# Patient Record
Sex: FEMALE | Race: WHITE | Employment: OTHER | URBAN - METROPOLITAN AREA
[De-identification: names, ages, dates, MRNs, and addresses within clinical notes are randomized per-mention and may not be internally consistent; named-entity substitution may affect disease eponyms.]

---

## 2017-02-08 ENCOUNTER — OFFICE VISIT (OUTPATIENT)
Dept: NEUROLOGY | Age: 62
End: 2017-02-08

## 2017-02-08 VITALS
SYSTOLIC BLOOD PRESSURE: 124 MMHG | DIASTOLIC BLOOD PRESSURE: 70 MMHG | HEIGHT: 61 IN | RESPIRATION RATE: 20 BRPM | WEIGHT: 174 LBS | BODY MASS INDEX: 32.85 KG/M2

## 2017-02-08 DIAGNOSIS — G20 PARKINSON DISEASE (HCC): Primary | ICD-10-CM

## 2017-02-08 RX ORDER — ALBUTEROL SULFATE 90 UG/1
AEROSOL, METERED RESPIRATORY (INHALATION)
COMMUNITY
End: 2022-01-10

## 2017-02-08 NOTE — MR AVS SNAPSHOT
Visit Information     Date & Time Provider Department Dept. Phone Encounter #    2/8/2017 11:40 AM Guzman Tripp MD [facility] [phone] [id number]      Follow-up Instructions     Return in about 6 months (around 8/8/2017). Upcoming Health Maintenance        Date Due    Hepatitis C Screening 1955    DTaP/Tdap/Td series (1 - Tdap) 9/10/1974    PAP AKA CERVICAL CYTOLOGY 9/10/1974    FOBT Q 1 YEAR AGE 50-75 9/10/2003    ZOSTER VACCINE AGE 60> 9/10/2013    INFLUENZA AGE 9 TO ADULT 8/1/2016    BREAST CANCER SCRN MAMMOGRAM 12/14/2018      Allergies as of 2/8/2017  Review Complete On: 2/8/2017 By: Jagdish Richter LPN       Severity Noted Reaction Type Reactions    Ciprofloxacin  08/18/2015    Rash    Oxycontin [Oxycodone]  06/22/2011    Shortness of Breath, Itching    Sulfa (Sulfonamide Antibiotics)  06/22/2011    Rash    Zocor [Simvastatin]  08/18/2015    Myalgia      Current Immunizations  Reviewed on 7/20/2011    No immunizations on file. Not reviewed this visit   You Were Diagnosed With        Codes Comments    [facility]    -  Primary ICD-10-CM: G20  ICD-9-CM: 332.0       Vitals     BP Resp Height(growth percentile) Weight(growth percentile) BMI OB Status    124/70 20 5' 1\" (1.549 m) 174 lb (78.9 kg) 32.88 kg/m2 Postmenopausal    Smoking Status                   Never Smoker         Vitals History      BMI and BSA Data     Body Mass Index Body Surface Area    32.88 kg/m 2 1.84 m 2         Preferred Pharmacy       Pharmacy Name Phone    [facility] [id number] [facility] [phone]         Your Updated Medication List          This list is accurate as of: 2/8/17 12:13 PM.  Always use your most recent med list.                AFRIN SINUS (OXYMETAZOLINE) 0.05 % nasal spray   Generic drug:  oxymetazoline   2 Sprays by Both Nostrils route two (2) times a day.        albuterol 90 mcg/actuation inhaler   Commonly known as:  PROVENTIL HFA, VENTOLIN HFA, PROAIR HFA   Take  by inhalation. ALEVE PO   Take 440 mg by mouth two (2) times a day. aspirin 325 mg tablet   Commonly known as:  ASPIRIN   Take 325 mg by mouth daily. budesonide 180 mcg/actuation Aepb inhaler   Commonly known as:  PULMICORT   Take  by inhalation two (2) times a day. CALCIUM 600 + D 600-125 mg-unit Tab   Generic drug:  calcium-cholecalciferol (d3)   Take  by mouth. * carbidopa-levodopa 36. mg Cper   Commonly known as:  RYTARY   Take 1 Cap by mouth three (3) times daily. Take 1st dose 30-45 minutes before breakfast.  Replaces Carbidopa-levodopa  Indications: PARKINSONISM       * carbidopa-levodopa 36. mg Cper   Commonly known as:  RYTARY   Take 1 Cap by mouth three (3) times daily. Take 1st dose 30 to 45 minutes before breakfast.  Replaces Carbidopa-Levodopa       CRESTOR 5 mg tablet   Generic drug:  rosuvastatin   Take  by mouth nightly.       gabapentin 300 mg capsule   Commonly known as:  NEURONTIN   Take 300 mg by mouth three (3) times daily. hydroCHLOROthiazide 25 mg tablet   Commonly known as:  HYDRODIURIL   Take 25 mg by mouth daily. lansoprazole 30 mg disintegrating tablet   Commonly known as:  PREVACID SOLUTAB   Take  by mouth daily. LIPITOR 10 mg tablet   Generic drug:  atorvastatin   Take  by mouth daily. LORazepam 0.5 mg tablet   Commonly known as:  ATIVAN   Take  by mouth every four (4) hours as needed. MIRALAX 17 gram packet   Generic drug:  polyethylene glycol   Take 17 g by mouth daily. NEILMED SINUS RINSE COMPLETE NA   by Nasal route. OPANA ER 10 mg tablet   Generic drug:  oxyMORphone   Take 10 mg by mouth every twelve (12) hours. rasagiline 1 mg tablet   Commonly known as:  AZILECT   Take 1 Tab by mouth daily. RESTASIS 0.05 % ophthalmic emulsion   Generic drug:  cycloSPORINE   Administer 1 Drop to both eyes two (2) times a day.        rOPINIRole 6 mg Tb24   Take 6 mg by mouth daily for 90 days. Indications: IDIOPATHIC PARKINSONISM       VITAMIN D2 50,000 unit capsule   Generic drug:  ergocalciferol   Take 50,000 Units by mouth every seven (7) days. XOPENEX HFA 45 mcg/actuation inhaler   Generic drug:  levalbuterol tartrate   Take  by inhalation every six (6) hours as needed. * Notice: This list has 2 medication(s) that are the same as other medications prescribed for you. Read the directions carefully, and ask your doctor or other care provider to review them with you. Follow-up Instructions     Return in about 6 months (around 8/8/2017). Patient Instructions         Learning About Living [place]  What is a living will? A living will is a legal form you use to write down the kind of care you want at the end of your life. It is used by the health professionals who will treat you if you aren't able to decide for yourself. If you put your wishes in writing, your loved ones and others will know what kind of care you want. They won't need to guess. This can ease your mind and be helpful to others. A living will is not the same as an estate or property will. An estate will explains what you want to happen with your money and property after you die. Is a living will a legal document? A living will is a legal document. Each state has its own laws about living bruce. If you move to another state, make sure that your living will is legal in the state where you now live. Or you might use a universal form that has been approved by many states. This kind of form can sometimes be completed and stored online. Your electronic copy will then be available wherever you have a connection to the Internet. In most cases, doctors will respect your wishes even if you have a form from a different state. · You don't need an  to complete a living will.  But legal advice can be helpful if your state's laws are unclear, your health history is complicated, or your family can't agree on what should be in your living will. · You can change your living will at any time. Some people find that their wishes about end-of-life care change as their health changes. · In addition to making a living will, think about completing a medical power of  form. This form lets you name the person you want to make end-of-life treatment decisions for you (your \"health care agent\") if you're not able to. Many hospitals and nursing homes will give you the forms you need to complete a living will and a medical power of . · Your living will is used only if you can't make or communicate decisions for yourself anymore. If you become able to make decisions again, you can accept or refuse any treatment, no matter what you wrote in your living will. · Your state may offer an online registry. This is a place where you can store your living will online so the doctors and nurses who need to treat you can find it right away. What should you think about when creating a living will? Talk about your end-of-life wishes with your family members and your doctor. Let them know what you want. That way the people making decisions for you won't be surprised by your choices. Think about these questions as you make your living will:  · Do you know enough about life support methods that might be used? If not, talk to your doctor so you know what might be done if you can't breathe on your own, your heart stops, or you're unable to swallow. · What things would you still want to be able to do after you receive life-support methods? Would you want to be able to walk? To speak? To eat on your own? To live without the help of machines? · If you have a choice, where do you want to be cared for? In your home? At a hospital or nursing home? · Do you want certain Denominational practices performed if you become very ill? · If you have a choice at the end of your life, where would you prefer to die? At home?  In a hospital or nursing home? Somewhere else? · Would you prefer to be buried or cremated? · Do you want your organs to be donated after you die? What should you do with your living will? · Make sure that your family members and your health care agent have copies of your living will. · Give your doctor a copy of your living will to keep in your medical record. If you have more than one doctor, make sure that each one has a copy. · You may want to put a copy of your living will where it can be easily found. Where can you learn more? Go to http://deepaGloucester Pharmaceuticalsdimitris.info/. Enter J566 in the search box to learn more about \"Learning About Living Luisa Thomas. \"  Current as of: February 24, 2016  Content Version: 11.1  © 8042-9305 [employer]. Care instructions adapted under license by [employer] (which disclaims liability or warranty for this information). If you have questions about a medical condition or this instruction, always ask your healthcare professional. [place] [address] any warranty or liability for your use of this information. Advance Directives: Care Instructions  Your Care Instructions  An advance directive is a legal way to state your wishes at the end of your life. It tells your family and your doctor what to do if you can no longer say what you want. There are two main types of advance directives. You can change them any time that your wishes change. · A living will tells your family and your doctor your wishes about life support and other treatment. · A medical power of  lets you name a person to make treatment decisions for you when you can't speak for yourself. This person is called a health care agent. If you do not have an advance directive, decisions about your medical care may be made by a doctor or a  who doesn't know you. It may help to think of an advance directive as a gift to the people who care for you.  If you have one, they won't have to make tough decisions by themselves. Follow-up care is a key part of your treatment and safety. Be sure to make and go to all appointments, and call your doctor if you are having problems. It's also a good idea to know your test results and keep a list of the medicines you take. How can you care for yourself at home? · Discuss your wishes with your loved ones and your doctor. This way, there are no surprises. · Many states have a unique form. Or you might use a universal form that has been approved by many states. This kind of form can sometimes be completed and stored online. Your electronic copy will then be available wherever you have a connection to the Internet. In most cases, doctors will respect your wishes even if you have a form from a different state. · You don't need a  to do an advance directive. But you may want to get legal advice. · Think about these questions when you prepare an advance directive:  ¨ Who do you want to make decisions about your medical care if you are not able to? Many people choose a family member, close friend, or doctor. ¨ Do you know enough about life support methods that might be used? If not, talk to your doctor so you understand. ¨ What are you most afraid of that might happen? You might be afraid of having pain, losing your independence, or being kept alive by machines. ¨ Where would you prefer to die? Choices include your home, a hospital, or a nursing home. ¨ Would you like to have information about hospice care to support you and your family? ¨ Do you want to donate organs when you die? ¨ Do you want certain Worship practices performed before you die? If so, put your wishes in the advance directive. · Read your advance directive every year, and make changes as needed. When should you call for help? Be sure to contact your doctor if you have any questions. Where can you learn more?   Go to http://deepa-dimitris.info/. Enter R264 in the search box to learn more about \"Advance Directives: Care Instructions. \"  Current as of: February 24, 2016  Content Version: 11.1  © 6439-4730 [employer], [facility]. Care instructions adapted under license by [employer] (which disclaims liability or warranty for this information). If you have questions about a medical condition or this instruction, always ask your healthcare professional. [address] any warranty or liability for your use of this information. Introducing [facility]! Dear Linh Lozano:  Thank you for requesting a Nirvaha account. Our records indicate that you already have an active Nirvaha account. You can access your account anytime at [email or web address]     Did you know that you can access your hospital and ER discharge instructions at any time in Nirvaha? You can also review all of your test results from your hospital stay or ER visit. Additional Information    If you have questions, please visit the Frequently Asked Questions section of the Nirvaha website at [email or web address] Remember, Nirvaha is NOT to be used for urgent needs. For medical emergencies, dial 911. Now available from your iPhone and Android! Please provide this summary of care documentation to your next provider. Your primary care clinician is listed as [place]. If you have any questions after today's visit, please call [phone].

## 2017-02-08 NOTE — PATIENT INSTRUCTIONS
Learning About Living Anca  What is a living will? A living will is a legal form you use to write down the kind of care you want at the end of your life. It is used by the health professionals who will treat you if you aren't able to decide for yourself. If you put your wishes in writing, your loved ones and others will know what kind of care you want. They won't need to guess. This can ease your mind and be helpful to others. A living will is not the same as an estate or property will. An estate will explains what you want to happen with your money and property after you die. Is a living will a legal document? A living will is a legal document. Each state has its own laws about living bruce. If you move to another state, make sure that your living will is legal in the state where you now live. Or you might use a universal form that has been approved by many states. This kind of form can sometimes be completed and stored online. Your electronic copy will then be available wherever you have a connection to the Internet. In most cases, doctors will respect your wishes even if you have a form from a different state. · You don't need an  to complete a living will. But legal advice can be helpful if your state's laws are unclear, your health history is complicated, or your family can't agree on what should be in your living will. · You can change your living will at any time. Some people find that their wishes about end-of-life care change as their health changes. · In addition to making a living will, think about completing a medical power of  form. This form lets you name the person you want to make end-of-life treatment decisions for you (your \"health care agent\") if you're not able to. Many hospitals and nursing homes will give you the forms you need to complete a living will and a medical power of .   · Your living will is used only if you can't make or communicate decisions for yourself anymore. If you become able to make decisions again, you can accept or refuse any treatment, no matter what you wrote in your living will. · Your state may offer an online registry. This is a place where you can store your living will online so the doctors and nurses who need to treat you can find it right away. What should you think about when creating a living will? Talk about your end-of-life wishes with your family members and your doctor. Let them know what you want. That way the people making decisions for you won't be surprised by your choices. Think about these questions as you make your living will:  · Do you know enough about life support methods that might be used? If not, talk to your doctor so you know what might be done if you can't breathe on your own, your heart stops, or you're unable to swallow. · What things would you still want to be able to do after you receive life-support methods? Would you want to be able to walk? To speak? To eat on your own? To live without the help of machines? · If you have a choice, where do you want to be cared for? In your home? At a hospital or nursing home? · Do you want certain Nondenominational practices performed if you become very ill? · If you have a choice at the end of your life, where would you prefer to die? At home? In a hospital or nursing home? Somewhere else? · Would you prefer to be buried or cremated? · Do you want your organs to be donated after you die? What should you do with your living will? · Make sure that your family members and your health care agent have copies of your living will. · Give your doctor a copy of your living will to keep in your medical record. If you have more than one doctor, make sure that each one has a copy. · You may want to put a copy of your living will where it can be easily found. Where can you learn more? Go to http://deepa-dimitris.info/.   Enter R960 in the search box to learn more about \"Learning About Living Anca. \"  Current as of: February 24, 2016  Content Version: 11.1  © 6726-0096 [employer]. Care instructions adapted under license by [employer] (which disclaims liability or warranty for this information). If you have questions about a medical condition or this instruction, always ask your healthcare professional. [address] any warranty or liability for your use of this information. Advance Directives: Care Instructions  Your Care Instructions  An advance directive is a legal way to state your wishes at the end of your life. It tells your family and your doctor what to do if you can no longer say what you want. There are two main types of advance directives. You can change them any time that your wishes change. · A living will tells your family and your doctor your wishes about life support and other treatment. · A medical power of  lets you name a person to make treatment decisions for you when you can't speak for yourself. This person is called a health care agent. If you do not have an advance directive, decisions about your medical care may be made by a doctor or a  who doesn't know you. It may help to think of an advance directive as a gift to the people who care for you. If you have one, they won't have to make tough decisions by themselves. Follow-up care is a key part of your treatment and safety. Be sure to make and go to all appointments, and call your doctor if you are having problems. It's also a good idea to know your test results and keep a list of the medicines you take. How can you care for yourself at home? · Discuss your wishes with your loved ones and your doctor. This way, there are no surprises. · Many states have a unique form. Or you might use a universal form that has been approved by many states. This kind of form can sometimes be completed and stored online.  Your electronic copy will then be available wherever you have a connection to the Internet. In most cases, doctors will respect your wishes even if you have a form from a different state. · You don't need a  to do an advance directive. But you may want to get legal advice. · Think about these questions when you prepare an advance directive:  ¨ Who do you want to make decisions about your medical care if you are not able to? Many people choose a family member, close friend, or doctor. ¨ Do you know enough about life support methods that might be used? If not, talk to your doctor so you understand. ¨ What are you most afraid of that might happen? You might be afraid of having pain, losing your independence, or being kept alive by machines. ¨ Where would you prefer to die? Choices include your home, a hospital, or a nursing home. ¨ Would you like to have information about hospice care to support you and your family? ¨ Do you want to donate organs when you die? ¨ Do you want certain Mormonism practices performed before you die? If so, put your wishes in the advance directive. · Read your advance directive every year, and make changes as needed. When should you call for help? Be sure to contact your doctor if you have any questions. Where can you learn more? Go to http://deepa-dimitris.info/. Enter R264 in the search box to learn more about \"Advance Directives: Care Instructions. \"  Current as of: February 24, 2016  Content Version: 11.1  © 0109-8295 [employer]. Care instructions adapted under license by [employer] (which disclaims liability or warranty for this information). If you have questions about a medical condition or this instruction, always ask your healthcare professional. [address] any warranty or liability for your use of this information.

## 2017-02-08 NOTE — PROGRESS NOTES
Interval HPI:   This is a 59 y.o. female with Parkinson's who's following up for:     Chief Complaint   Patient presents with    Parkinsons Disease     Current meds:   Requip XL 6 mg/ AM, Sinemet one tab QID (6 AM, 9 AM, 2 PM, 6 PM), Azilect 1 mg/ AM  Higher doses of requip caused her significant dyskinesias. Lower doses of requip or sinemet worsen her parkinson's (feels stiffer, gait seems more shuffling)  Has not had tremors for a long time    Pt thinks things are about the same as last visit. Only thing new is that she's have episodes sometime in the late afternoon where she'll be sitting around and her legs look like they're shaking/ tremor. It can go on for up to an hour (not always an hour), not painful, not spasms. enies that she's having any paresthesias in legs that are causing her to move legs around. Can't say whether standing up and moving around will lessen the symptoms (?RLS) as she mainly just lets it take its course. Can't tell if it's related to her late morning/ early afternoon Sinemet dose wearing off early. Was complaining of hypophonia at last visit but says hasn't been bothering her/ others as much recently. Was having occasional nightmares, but those resolved for the most part after moving her Requip and Azilect to the morning. Gave her samples of Rytary at last visit but she couldn't tell much improvement (gave her a starting/ slightly lower dose) and she couldn't afford it at the time.          =============================================  Brief Hx:   Initially seen in June 2011 for Parkinson's/ mild at that time. Was on Requip XL 12 mg/ day and Azilect 1 mg TID. At a follow up visit Nov 2011 she described some worsening symptoms after a recent surgery, so Stalevo 50 mg BID added. Pt lost to follow up for 2 years at when she returned said she never tried the Stalevo. In Dec 2013 she reported increased fatigue, \"slowed down a lot,\" worsening handwriting, lower voice. Added Stalevo 12.5 mg TID. At follow up that was increased to 50 mg BID, Requip XL and Azilect continued at same doses. At follow up in Dec 2014, reported that when on Stalevo she has better mobility. Went to see [address] [place] [address] and she says the told her to stop the Stalevo and restart Sinemet  TID. She reported doing better overall at that point. Sometime between April and June 2015, insurance denied her Requip XL and she had to take Requip 2 mg TID. She called back saying she had abnormal body movements on the immediate release formulation of Requip and requested to go back to the XL version as didn't have SEFx with that that she could recall. Patient believes she restarted the Requip XL but EMR still has her listed as taking Requip 2 mg TID. She continued the Sinemet  TID and the Azilect 1 mg daily. Last visit note from January 2016 indicates that she wasn't having any tremors and her gait was normal on this regimen of medications so it was continued. At visit in May 2016, pt was c/o frequent body motions, can't sit still, rocks while sitting or when standing seems to move side to side. Appears restless in office and  / patient agree that what she's trying to describe does seem like body restlessness. No abnormal facial movements. Says after re-starting sinemet (Dec 2014) her daughter noted that he tongue kept sticking in/ out, but that hasn't happened in a while. Not on any antipsychotics or antidepressants. Was taking Lorazepam 0.5 mg every 4 hours prn. Pt was unclear what formulation/ dose Requip she was taking. Called back and let office know it was Requip XL 12 mg/ day. We gradually tapered it down to Requip XL 6 mg once a day and the akithisia resolved. Brief ROS: as above or otherwise negative  There have been no significant changes in PMHx, PSHx, SHx except as noted above.      Allergies   Allergen Reactions    Ciprofloxacin Rash    Oxycontin [Oxycodone] Shortness of Breath and Itching    Sulfa (Sulfonamide Antibiotics) Rash    Zocor [Simvastatin] Myalgia     Current Outpatient Prescriptions   Medication Sig Dispense Refill    albuterol (PROVENTIL HFA, VENTOLIN HFA, PROAIR HFA) 90 mcg/actuation inhaler Take  by inhalation.  atorvastatin (LIPITOR) 10 mg tablet Take  by mouth daily.  cycloSPORINE (RESTASIS) 0.05 % ophthalmic emulsion Administer 1 Drop to both eyes two (2) times a day.  OxyMORPhone (OPANA ER) 10 mg tablet Take 10 mg by mouth every twelve (12) hours.  gabapentin (NEURONTIN) 300 mg capsule Take 300 mg by mouth three (3) times daily.  calcium-cholecalciferol, d3, (CALCIUM 600 + D) 600-125 mg-unit Tab Take  by mouth.  SODIUM CHLORIDE/SODIUM BICARB (NEILMED SINUS RINSE COMPLETE NA) by Nasal route.  polyethylene glycol (MIRALAX) 17 gram packet Take 17 g by mouth daily.  aspirin (ASPIRIN) 325 mg tablet Take 325 mg by mouth daily.  NAPROXEN SODIUM (ALEVE PO) Take 440 mg by mouth two (2) times a day.  hydrochlorothiazide (HYDRODIURIL) 25 mg tablet Take 25 mg by mouth daily.  budesonide (PULMICORT) 180 mcg/Inhalation AePB inhaler Take  by inhalation two (2) times a day.  lorazepam (ATIVAN) 0.5 mg tablet Take  by mouth every four (4) hours as needed.  levalbuterol tartrate (XOPENEX HFA) 45 mcg/Actuation inhaler Take  by inhalation every six (6) hours as needed.  carbidopa-levodopa (RYTARY) 36. mg cpER Take 1 Cap by mouth three (3) times daily. Take 1st dose 30-45 minutes before breakfast.  Replaces Carbidopa-levodopa  Indications: PARKINSONISM 50 Cap 0    carbidopa-levodopa (RYTARY) 36. mg cpER Take 1 Cap by mouth three (3) times daily. Take 1st dose 30 to 45 minutes before breakfast.  Replaces Carbidopa-Levodopa 90 Cap 2    rOPINIRole 6 mg Tb24 Take 6 mg by mouth daily for 90 days.  Indications: IDIOPATHIC PARKINSONISM 90 Tab 1    rasagiline (AZILECT) 1 mg tablet Take 1 Tab by mouth daily. 90 Tab 1    oxymetazoline (AFRIN SINUS, OXYMETAZOLINE,) 0.05 % nasal spray 2 Sprays by Both Nostrils route two (2) times a day.  rosuvastatin (CRESTOR) 5 mg tablet Take  by mouth nightly.  ergocalciferol (VITAMIN D) 50,000 unit capsule Take 50,000 Units by mouth every seven (7) days.  lansoprazole (PREVACID SOLUTAB) 30 mg disintegrating tablet Take  by mouth daily. Physical Exam  Blood pressure 124/70, resp. rate 20, height 5' 1\" (1.549 m), weight 78.9 kg (174 lb). NAD, resting, no noticeable restlessness/ akithisia, no tremor   Neck: no stiffness  CV: regular heart rate  Lungs: clear  Skin: no rashes    Focused Neurological Exam     Mental status: Alert and oriented to person, place situation. Language: normal fluency and comprehension; no dysarthria  mildly low voice the longer she talks      CNs:   Visual fields grossly normal  Extraocular movements intact, no nystagmus  Face appears symmetric and facial strength normal.    Hearing is intact to casual conversation. Sensory: intact light touch in all 4 extremities  Motor: Normal bulk and strength in all 4 extremities. Cerebellar/ Extrapyramidal:   no resting tremor  no cog-wheeling     Reflexes: DTRs are symmetric, 2+     Gait: normal speed, no shuffling, reduced left arm swing, normal right arm swing, no en-bloc turning     Impression      ICD-10-CM ICD-9-CM    1. [facility] ([facility] G20 332.0         Overall Parkinson's stable from current combination of meds. Some of gait difficulty is due to chronic hip pains. Came up with plan that patient will pay more attention to the leg shaking (restlessness versus restless legs), see if it improves if she gets up and walks around (suggesting RLS), at what time of day it generally happens, after or before which Sinemet dose.   She'll then try taking one of her left over Requip 2 mg (immediate release) tablets prior to the typical onset of the leg shaking spells (happening a few days a week. ). She'll call back when she needs refills of her current meds and I'll send in 90 day Rxs.   See her back in 6 months

## 2017-07-11 ENCOUNTER — PATIENT MESSAGE (OUTPATIENT)
Dept: NEUROLOGY | Age: 62
End: 2017-07-11

## 2017-07-11 NOTE — TELEPHONE ENCOUNTER
From: Paulette Barajas  To: Lenore Ramirez MD  Sent: 7/11/2017 7:42 AM EDT  Subject: Prescription Question    Would you be willing to oversee the withdrawal of Opana and a replacement if necessary? FY.i- my previous experience with hydrocodone/oxycodone was not successful. I have also have some issues late day that I believe is medicine induced that I would like to review with you. I had planned on discussing them with you my next appointmen, August 9. The Opana market withdrawal needs immediate involvement. Thank you!

## 2017-07-11 NOTE — TELEPHONE ENCOUNTER
Called and spoke to the pt informing her that Dr. Yasir Olsen cannot monitor the withdrawal of OPANA.

## 2017-07-11 NOTE — TELEPHONE ENCOUNTER
----- Message from Karlene Church sent at 7/11/2017  7:42 AM EDT -----  Regarding: Prescription Question  Contact: [phone]  Would you be willing to oversee the withdrawal of Opana and a replacement if necessary? FY.i- my previous experience with hydrocodone/oxycodone was not successful. I have also have some issues late day that I believe is medicine induced that I would like to review with you. I had planned on discussing them with you my next appointmen, August 9. The Opana market withdrawal needs immediate involvement. Thank you!

## 2017-08-09 ENCOUNTER — OFFICE VISIT (OUTPATIENT)
Dept: NEUROLOGY | Age: 62
End: 2017-08-09

## 2017-08-09 VITALS
OXYGEN SATURATION: 96 % | BODY MASS INDEX: 33.23 KG/M2 | HEART RATE: 105 BPM | DIASTOLIC BLOOD PRESSURE: 70 MMHG | SYSTOLIC BLOOD PRESSURE: 104 MMHG | HEIGHT: 61 IN | WEIGHT: 176 LBS

## 2017-08-09 DIAGNOSIS — G20 PARKINSON DISEASE (HCC): Primary | ICD-10-CM

## 2017-08-09 RX ORDER — ROPINIROLE 6 MG/1
1 TABLET, FILM COATED, EXTENDED RELEASE ORAL DAILY
COMMUNITY
Start: 2017-07-31 | End: 2017-08-09 | Stop reason: SDUPTHER

## 2017-08-09 RX ORDER — ROPINIROLE 6 MG/1
1 TABLET, FILM COATED, EXTENDED RELEASE ORAL DAILY
Qty: 90 TAB | Refills: 1 | Status: SHIPPED | OUTPATIENT
Start: 2017-08-09 | End: 2017-11-07

## 2017-08-09 RX ORDER — CARBIDOPA AND LEVODOPA 25; 100 MG/1; MG/1
1 TABLET ORAL 4 TIMES DAILY
Qty: 360 TAB | Refills: 1 | Status: SHIPPED | OUTPATIENT
Start: 2017-08-09 | End: 2017-11-07

## 2017-08-09 RX ORDER — CARBIDOPA AND LEVODOPA 25; 100 MG/1; MG/1
1 TABLET ORAL 4 TIMES DAILY
COMMUNITY
End: 2017-08-09 | Stop reason: SDUPTHER

## 2017-08-09 RX ORDER — RASAGILINE 1 MG/1
1 TABLET ORAL DAILY
Qty: 90 TAB | Refills: 1 | Status: SHIPPED | OUTPATIENT
Start: 2017-08-09

## 2017-08-09 NOTE — PROGRESS NOTES
Interval HPI:   This is a 59 y.o. female with Parkinson's who's following up for:     Chief Complaint   Patient presents with    Parkinsons Disease     Current meds:   Requip XL 6 mg/ AM, Sinemet one tab QID (6 AM, 9 AM, 2 PM, 6 PM), Azilect 1 mg/ AM.   Higher doses of requip caused her significant dyskinesias. Lower doses of requip or sinemet worsen her parkinson's (feels stiffer, gait seems more shuffling). Has not had tremors for a long time. No change in Parkinson's symptoms that she can think of. Was feeling sleepy in the evening/ dinner time so stopped taking evening dose of Sinemet. She/  thinks her sleepiness is better now. Has multiple questions that are semi-related to Parkinson's. Says that she's been taking Opana for a long time for back/ hip pains, but because its being taken off the market, she's being tapered off that and may be going onto [place] versus tapering off of narcotics completely and doing PT and using gabapentin to address her pain.         =============================================  Brief Hx:   Initially seen in June 2011 for Parkinson's/ mild at that time. Was on Requip XL 12 mg/ day and Azilect 1 mg TID. At a follow up visit Nov 2011 she described some worsening symptoms after a recent surgery, so Stalevo 50 mg BID added. Pt lost to follow up for 2 years at when she returned said she never tried the Stalevo. In Dec 2013 she reported increased fatigue, \"slowed down a lot,\" worsening handwriting, lower voice. Added Stalevo 12.5 mg TID. At follow up that was increased to 50 mg BID, Requip XL and Azilect continued at same doses. At follow up in Dec 2014, reported that when on Stalevo she has better mobility. Went to see [address] [place] [address] and she says the told her to stop the Stalevo and restart Sinemet  TID. She reported doing better overall at that point.   Sometime between April and June 2015, insurance denied her Requip XL and she had to take Requip 2 mg TID. She called back saying she had abnormal body movements on the immediate release formulation of Requip and requested to go back to the XL version as didn't have SEFx with that that she could recall. Patient believes she restarted the Requip XL but EMR still has her listed as taking Requip 2 mg TID. She continued the Sinemet  TID and the Azilect 1 mg daily. Last visit note from January 2016 indicates that she wasn't having any tremors and her gait was normal on this regimen of medications so it was continued. At visit in May 2016, pt was c/o frequent body motions, can't sit still, rocks while sitting or when standing seems to move side to side. Appears restless in office and  / patient agree that what she's trying to describe does seem like body restlessness. No abnormal facial movements. Says after re-starting sinemet (Dec 2014) her daughter noted that he tongue kept sticking in/ out, but that hasn't happened in a while. Not on any antipsychotics or antidepressants. Was taking Lorazepam 0.5 mg every 4 hours prn. Pt was unclear what formulation/ dose Requip she was taking. Called back and let office know it was Requip XL 12 mg/ day. We gradually tapered it down to Requip XL 6 mg once a day and the akithisia resolved. Brief ROS: as above or otherwise negative  There have been no significant changes in PMHx, PSHx, SHx except as noted above. Allergies   Allergen Reactions    Ciprofloxacin Rash    Oxycontin [Oxycodone] Shortness of Breath and Itching    Sulfa (Sulfonamide Antibiotics) Rash    Zocor [Simvastatin] Myalgia     Current Outpatient Prescriptions   Medication Sig Dispense Refill    carbidopa-levodopa (SINEMET)  mg per tablet Take 1 Tab by mouth four (4) times daily for 90 days. 360 Tab 1    rOPINIRole 6 mg Tb24 Take 1 Tab by mouth daily for 90 days. 90 Tab 1    rasagiline (AZILECT) 1 mg tablet Take 1 Tab by mouth daily.  90 Tab 1    albuterol (PROVENTIL HFA, VENTOLIN HFA, PROAIR HFA) 90 mcg/actuation inhaler Take  by inhalation.  atorvastatin (LIPITOR) 10 mg tablet Take  by mouth daily.  oxymetazoline (AFRIN SINUS, OXYMETAZOLINE,) 0.05 % nasal spray 2 Sprays by Both Nostrils route two (2) times a day.  OxyMORPhone (OPANA ER) 10 mg tablet Take 10 mg by mouth every twelve (12) hours.  gabapentin (NEURONTIN) 300 mg capsule Take 300 mg by mouth three (3) times daily.  calcium-cholecalciferol, d3, (CALCIUM 600 + D) 600-125 mg-unit Tab Take  by mouth.  SODIUM CHLORIDE/SODIUM BICARB (NEILMED SINUS RINSE COMPLETE NA) by Nasal route.  polyethylene glycol (MIRALAX) 17 gram packet Take 17 g by mouth daily.  aspirin (ASPIRIN) 325 mg tablet Take 325 mg by mouth daily.  NAPROXEN SODIUM (ALEVE PO) Take 440 mg by mouth two (2) times a day.  ergocalciferol (VITAMIN D) 50,000 unit capsule Take 50,000 Units by mouth every seven (7) days.  hydrochlorothiazide (HYDRODIURIL) 25 mg tablet Take 25 mg by mouth daily.  budesonide (PULMICORT) 180 mcg/Inhalation AePB inhaler Take  by inhalation two (2) times a day.  lorazepam (ATIVAN) 0.5 mg tablet Take  by mouth every four (4) hours as needed.  levalbuterol tartrate (XOPENEX HFA) 45 mcg/Actuation inhaler Take  by inhalation every six (6) hours as needed.  cycloSPORINE (RESTASIS) 0.05 % ophthalmic emulsion Administer 1 Drop to both eyes two (2) times a day.  rosuvastatin (CRESTOR) 5 mg tablet Take  by mouth nightly.  lansoprazole (PREVACID SOLUTAB) 30 mg disintegrating tablet Take  by mouth daily. Physical Exam  Blood pressure 104/70, pulse (!) 105, height 5' 1\" (1.549 m), weight 79.8 kg (176 lb), SpO2 96 %. NAD, resting, no noticeable restlessness/ akithisia, no tremor   Neck: no stiffness  Skin: no rashes    Focused Neurological Exam     Mental status: Alert and oriented to person, place situation.     Language: normal fluency and comprehension; no dysarthria  mildly low voice the longer she talks      CNs:   Visual fields grossly normal  Extraocular movements intact, no nystagmus  Face appears symmetric and facial strength normal.    Hearing is intact to casual conversation. Sensory: intact light touch in all 4 extremities  Motor: Normal bulk and strength in all 4 extremities. Cerebellar/ Extrapyramidal:   no resting tremor  no cog-wheeling     Reflexes: DTRs are symmetric, 2+     Gait: normal speed, no shuffling, reduced left arm swing/ tilts shoulders to right, normal right arm swing, no en-bloc turning     Impression      ICD-10-CM ICD-9-CM    1. Parkinson disease ([facility] G20 332.0 rasagiline (AZILECT) 1 mg tablet        Overall Parkinson's stable from current combination of meds. Continue all meds as is.   Requests a 90 day RF of Azilect 1 mg/ day so that was sent to pharmacy  Doesn't need RF of other meds at this point    F/u in 6 months

## 2017-08-09 NOTE — MR AVS SNAPSHOT
Visit Information     Date & Time Provider Department Dept. Phone Encounter #    8/9/2017 11:40 AM Ina Horvath MD [address] [phone] [id number]      Follow-up Instructions     Return in about 6 months (around 2/9/2018). Upcoming Health Maintenance        Date Due    Hepatitis C Screening 1955    DTaP/Tdap/Td series (1 - Tdap) 9/10/1974    PAP AKA CERVICAL CYTOLOGY 9/10/1974    FOBT Q 1 YEAR AGE 50-75 9/10/2003    ZOSTER VACCINE AGE 60> 7/10/2013    INFLUENZA AGE 9 TO ADULT 8/1/2017    BREAST CANCER SCRN MAMMOGRAM 12/14/2018      Allergies as of 8/9/2017  Review Complete On: 8/9/2017 By: Blanca Jordan LPN       Severity Noted Reaction Type Reactions    Ciprofloxacin  08/18/2015    Rash    Oxycontin [Oxycodone]  06/22/2011    Shortness of Breath, Itching    Sulfa (Sulfonamide Antibiotics)  06/22/2011    Rash    Zocor [Simvastatin]  08/18/2015    Myalgia      Current Immunizations  Reviewed on 7/20/2011    No immunizations on file.        Not reviewed this visit   You Were Diagnosed With        Codes Comments    [facility]    -  Primary ICD-10-CM: G20  ICD-9-CM: 332.0       Vitals     BP Pulse Height(growth percentile) Weight(growth percentile) SpO2 BMI    104/70 (BP 1 Location: Left arm, BP Patient Position: Sitting) (!) 105 5' 1\" (1.549 m) 176 lb (79.8 kg) 96% 33.25 kg/m2    OB Status Smoking Status                Postmenopausal Never Smoker        Vitals History      BMI and BSA Data     Body Mass Index Body Surface Area    33.25 kg/m 2 1.85 m 2         Preferred Pharmacy       Pharmacy Name Phone    [facility], [place] [address] [phone]         Your Updated Medication List          This list is accurate as of: 8/9/17 12:29 PM.  Always use your most recent med list.                AFRIN SINUS (OXYMETAZOLINE) 0.05 % nasal spray   Generic drug:  oxymetazoline   2 Sprays by Both Nostrils route two (2) times a day.       albuterol 90 mcg/actuation inhaler   Commonly known as:  PROVENTIL HFA, VENTOLIN HFA, PROAIR HFA   Take  by inhalation. ALEVE PO   Take 440 mg by mouth two (2) times a day. aspirin 325 mg tablet   Commonly known as:  ASPIRIN   Take 325 mg by mouth daily. budesonide 180 mcg/actuation Aepb inhaler   Commonly known as:  PULMICORT   Take  by inhalation two (2) times a day. CALCIUM 600 + D 600-125 mg-unit Tab   Generic drug:  calcium-cholecalciferol (d3)   Take  by mouth.       carbidopa-levodopa  mg per tablet   Commonly known as:  SINEMET   Take 1 Tab by mouth four (4) times daily for 90 days. CRESTOR 5 mg tablet   Generic drug:  rosuvastatin   Take  by mouth nightly.       gabapentin 300 mg capsule   Commonly known as:  NEURONTIN   Take 300 mg by mouth three (3) times daily. hydroCHLOROthiazide 25 mg tablet   Commonly known as:  HYDRODIURIL   Take 25 mg by mouth daily. lansoprazole 30 mg disintegrating tablet   Commonly known as:  PREVACID SOLUTAB   Take  by mouth daily. LIPITOR 10 mg tablet   Generic drug:  atorvastatin   Take  by mouth daily. LORazepam 0.5 mg tablet   Commonly known as:  ATIVAN   Take  by mouth every four (4) hours as needed. MIRALAX 17 gram packet   Generic drug:  polyethylene glycol   Take 17 g by mouth daily. NEILMED SINUS RINSE COMPLETE NA   by Nasal route. OPANA ER 10 mg tablet   Generic drug:  oxyMORphone   Take 10 mg by mouth every twelve (12) hours. rasagiline 1 mg tablet   Commonly known as:  AZILECT   Take 1 Tab by mouth daily. RESTASIS 0.05 % ophthalmic emulsion   Generic drug:  cycloSPORINE   Administer 1 Drop to both eyes two (2) times a day. rOPINIRole 6 mg Tb24   Take 1 Tab by mouth daily for 90 days. VITAMIN D2 50,000 unit capsule   Generic drug:  ergocalciferol   Take 50,000 Units by mouth every seven (7) days.        XOPENEX HFA 45 mcg/actuation inhaler   Generic drug: levalbuterol tartrate   Take  by inhalation every six (6) hours as needed. Prescriptions Sent to Pharmacy        Refills    carbidopa-levodopa (SINEMET)  mg per tablet 1    Sig: Take 1 Tab by mouth four (4) times daily for 90 days. Class: Normal    [facility] [address] [place] [address] Ph #: [phone]    Route: Oral    rOPINIRole 6 mg Tb24 1    Sig: Take 1 Tab by mouth daily for 90 days. Class: Normal    Pharmacy: [facility] Ph #: [phone]    Route: Oral    rasagiline (AZILECT) 1 mg tablet 1    Sig: Take 1 Tab by mouth daily. Class: Normal    Pharmacy: [address] [place] [address], [address] [place] [address] Ph #: [phone]    Route: Oral      Follow-up Instructions     Return in about 6 months (around 2/9/2018). Introducing hospitals & [facility] SERVICES! Dear Giselle Granado:  Thank you for requesting a [employer] account. Our records indicate that you already have an active [employer] account. You can access your account anytime at [email or web address] Goodman     Did you know that you can access your hospital and ER discharge instructions at any time in [employer]? You can also review all of your test results from your hospital stay or ER visit. Additional Information    If you have questions, please visit the Frequently Asked Questions section of the [employer] website at [email or web address] Remember, [employer] is NOT to be used for urgent needs. For medical emergencies, dial 911. Now available from your iPhone and Android! Please provide this summary of care documentation to your next provider. Your primary care clinician is listed as [place]. If you have any questions after today's visit, please call [phone].

## 2017-09-06 ENCOUNTER — APPOINTMENT (OUTPATIENT)
Dept: CT IMAGING | Age: 62
End: 2017-09-06
Attending: EMERGENCY MEDICINE
Payer: COMMERCIAL

## 2017-09-06 ENCOUNTER — HOSPITAL ENCOUNTER (EMERGENCY)
Age: 62
Discharge: HOME | End: 2017-09-06
Attending: EMERGENCY MEDICINE | Admitting: EMERGENCY MEDICINE
Payer: COMMERCIAL

## 2017-09-06 ENCOUNTER — APPOINTMENT (OUTPATIENT)
Dept: GENERAL RADIOLOGY | Age: 62
End: 2017-09-06
Attending: EMERGENCY MEDICINE
Payer: COMMERCIAL

## 2017-09-06 ENCOUNTER — APPOINTMENT (OUTPATIENT)
Dept: ULTRASOUND IMAGING | Age: 62
End: 2017-09-06
Attending: EMERGENCY MEDICINE
Payer: COMMERCIAL

## 2017-09-06 VITALS
OXYGEN SATURATION: 93 % | TEMPERATURE: 97.3 F | HEART RATE: 112 BPM | SYSTOLIC BLOOD PRESSURE: 135 MMHG | RESPIRATION RATE: 20 BRPM | DIASTOLIC BLOOD PRESSURE: 66 MMHG

## 2017-09-06 DIAGNOSIS — M79.605 LEFT LEG PAIN: ICD-10-CM

## 2017-09-06 DIAGNOSIS — J45.901 ASTHMA WITH ACUTE EXACERBATION, UNSPECIFIED ASTHMA SEVERITY: Primary | ICD-10-CM

## 2017-09-06 DIAGNOSIS — J18.9 COMMUNITY ACQUIRED PNEUMONIA: ICD-10-CM

## 2017-09-06 DIAGNOSIS — R07.89 CHEST TIGHTNESS: ICD-10-CM

## 2017-09-06 LAB
ALBUMIN SERPL-MCNC: 3.9 G/DL (ref 3.5–5)
ALBUMIN/GLOB SERPL: 1.1 {RATIO} (ref 1.1–2.2)
ALP SERPL-CCNC: 95 U/L (ref 45–117)
ALT SERPL-CCNC: 10 U/L (ref 12–78)
ANION GAP SERPL CALC-SCNC: 9 MMOL/L (ref 5–15)
APTT PPP: 24.7 SEC (ref 22.1–32.5)
AST SERPL-CCNC: 20 U/L (ref 15–37)
ATRIAL RATE: 99 BPM
BASOPHILS # BLD: 0 K/UL (ref 0–0.1)
BASOPHILS NFR BLD: 0 % (ref 0–1)
BILIRUB SERPL-MCNC: 0.5 MG/DL (ref 0.2–1)
BUN SERPL-MCNC: 35 MG/DL (ref 6–20)
BUN/CREAT SERPL: 32 (ref 12–20)
CALCIUM SERPL-MCNC: 9.7 MG/DL (ref 8.5–10.1)
CALCULATED P AXIS, ECG09: 62 DEGREES
CALCULATED R AXIS, ECG10: -9 DEGREES
CALCULATED T AXIS, ECG11: 53 DEGREES
CHLORIDE SERPL-SCNC: 104 MMOL/L (ref 97–108)
CO2 SERPL-SCNC: 30 MMOL/L (ref 21–32)
CREAT SERPL-MCNC: 1.11 MG/DL (ref 0.55–1.02)
DIAGNOSIS, 93000: NORMAL
DIFFERENTIAL METHOD BLD: NORMAL
EOSINOPHIL # BLD: 0.2 K/UL (ref 0–0.4)
EOSINOPHIL NFR BLD: 3 % (ref 0–7)
ERYTHROCYTE [DISTWIDTH] IN BLOOD BY AUTOMATED COUNT: 12.8 % (ref 11.5–14.5)
GLOBULIN SER CALC-MCNC: 3.6 G/DL (ref 2–4)
GLUCOSE SERPL-MCNC: 89 MG/DL (ref 65–100)
HCT VFR BLD AUTO: 40.3 % (ref 35–47)
HGB BLD-MCNC: 13.4 G/DL (ref 11.5–16)
INR PPP: 1.1 (ref 0.9–1.1)
LYMPHOCYTES # BLD: 1.4 K/UL (ref 0.8–3.5)
LYMPHOCYTES NFR BLD: 23 % (ref 12–49)
MAGNESIUM SERPL-MCNC: 1.8 MG/DL (ref 1.6–2.4)
MCH RBC QN AUTO: 31.4 PG (ref 26–34)
MCHC RBC AUTO-ENTMCNC: 33.3 G/DL (ref 30–36.5)
MCV RBC AUTO: 94.4 FL (ref 80–99)
MONOCYTES # BLD: 0.4 K/UL (ref 0–1)
MONOCYTES NFR BLD: 7 % (ref 5–13)
NEUTS SEG # BLD: 4.2 K/UL (ref 1.8–8)
NEUTS SEG NFR BLD: 67 % (ref 32–75)
P-R INTERVAL, ECG05: 138 MS
PLATELET # BLD AUTO: 210 K/UL (ref 150–400)
POTASSIUM SERPL-SCNC: 3.5 MMOL/L (ref 3.5–5.1)
PROT SERPL-MCNC: 7.5 G/DL (ref 6.4–8.2)
PROTHROMBIN TIME: 10.5 SEC (ref 9–11.1)
Q-T INTERVAL, ECG07: 328 MS
QRS DURATION, ECG06: 88 MS
QTC CALCULATION (BEZET), ECG08: 420 MS
RBC # BLD AUTO: 4.27 M/UL (ref 3.8–5.2)
SODIUM SERPL-SCNC: 143 MMOL/L (ref 136–145)
THERAPEUTIC RANGE,PTTT: NORMAL SECS (ref 58–77)
TROPONIN I SERPL-MCNC: 0.04 NG/ML
VENTRICULAR RATE, ECG03: 99 BPM
WBC # BLD AUTO: 6.2 K/UL (ref 3.6–11)

## 2017-09-06 PROCEDURE — 85730 THROMBOPLASTIN TIME PARTIAL: CPT | Performed by: EMERGENCY MEDICINE

## 2017-09-06 PROCEDURE — 96374 THER/PROPH/DIAG INJ IV PUSH: CPT

## 2017-09-06 PROCEDURE — 85025 COMPLETE CBC W/AUTO DIFF WBC: CPT | Performed by: EMERGENCY MEDICINE

## 2017-09-06 PROCEDURE — 83735 ASSAY OF MAGNESIUM: CPT | Performed by: EMERGENCY MEDICINE

## 2017-09-06 PROCEDURE — 71275 CT ANGIOGRAPHY CHEST: CPT

## 2017-09-06 PROCEDURE — 94640 AIRWAY INHALATION TREATMENT: CPT

## 2017-09-06 PROCEDURE — 85610 PROTHROMBIN TIME: CPT | Performed by: EMERGENCY MEDICINE

## 2017-09-06 PROCEDURE — 74011000250 HC RX REV CODE- 250: Performed by: EMERGENCY MEDICINE

## 2017-09-06 PROCEDURE — 99285 EMERGENCY DEPT VISIT HI MDM: CPT

## 2017-09-06 PROCEDURE — 93971 EXTREMITY STUDY: CPT

## 2017-09-06 PROCEDURE — 74011636320 HC RX REV CODE- 636/320: Performed by: EMERGENCY MEDICINE

## 2017-09-06 PROCEDURE — 77030013140 HC MSK NEB VYRM -A

## 2017-09-06 PROCEDURE — 74011250636 HC RX REV CODE- 250/636: Performed by: EMERGENCY MEDICINE

## 2017-09-06 PROCEDURE — 80053 COMPREHEN METABOLIC PANEL: CPT | Performed by: EMERGENCY MEDICINE

## 2017-09-06 PROCEDURE — 93005 ELECTROCARDIOGRAM TRACING: CPT

## 2017-09-06 PROCEDURE — 36415 COLL VENOUS BLD VENIPUNCTURE: CPT | Performed by: EMERGENCY MEDICINE

## 2017-09-06 PROCEDURE — 71010 XR CHEST PORT: CPT

## 2017-09-06 PROCEDURE — 84484 ASSAY OF TROPONIN QUANT: CPT | Performed by: EMERGENCY MEDICINE

## 2017-09-06 PROCEDURE — 96361 HYDRATE IV INFUSION ADD-ON: CPT

## 2017-09-06 PROCEDURE — 74011250637 HC RX REV CODE- 250/637: Performed by: EMERGENCY MEDICINE

## 2017-09-06 RX ORDER — LEVALBUTEROL TARTRATE 45 UG/1
2 AEROSOL, METERED ORAL
Qty: 1 INHALER | Refills: 2 | Status: SHIPPED | OUTPATIENT
Start: 2017-09-06 | End: 2017-10-06

## 2017-09-06 RX ORDER — LEVALBUTEROL INHALATION SOLUTION 0.63 MG/3ML
0.63 SOLUTION RESPIRATORY (INHALATION)
Qty: 20 PACKAGE | Refills: 0 | Status: SHIPPED | OUTPATIENT
Start: 2017-09-06

## 2017-09-06 RX ORDER — NEBULIZER AND COMPRESSOR
1 EACH MISCELLANEOUS
Qty: 1 EACH | Refills: 0 | Status: SHIPPED | OUTPATIENT
Start: 2017-09-06

## 2017-09-06 RX ORDER — AZITHROMYCIN 250 MG/1
500 TABLET, FILM COATED ORAL
Status: COMPLETED | OUTPATIENT
Start: 2017-09-06 | End: 2017-09-06

## 2017-09-06 RX ORDER — AZITHROMYCIN 250 MG/1
250 TABLET, FILM COATED ORAL DAILY
Qty: 4 TAB | Refills: 0 | Status: SHIPPED | OUTPATIENT
Start: 2017-09-06 | End: 2017-09-10

## 2017-09-06 RX ADMIN — IOPAMIDOL 100 ML: 755 INJECTION, SOLUTION INTRAVENOUS at 07:25

## 2017-09-06 RX ADMIN — ALBUTEROL SULFATE 1 DOSE: 2.5 SOLUTION RESPIRATORY (INHALATION) at 06:24

## 2017-09-06 RX ADMIN — ALBUTEROL SULFATE 1 DOSE: 2.5 SOLUTION RESPIRATORY (INHALATION) at 07:40

## 2017-09-06 RX ADMIN — AZITHROMYCIN 500 MG: 250 TABLET, FILM COATED ORAL at 07:58

## 2017-09-06 RX ADMIN — SODIUM CHLORIDE 1000 ML: 900 INJECTION, SOLUTION INTRAVENOUS at 07:00

## 2017-09-06 NOTE — ED TRIAGE NOTES
Pt arrives to treatment room via wheelchair complaining of shortness of breath since last night. She used her xopenex inhaler with some relief.

## 2017-09-06 NOTE — ED NOTES
Pt c no wheezing at this point. sats 96%. cta shows ? Minimal inflammatory process, so will cover c atb.

## 2017-09-06 NOTE — ED PROVIDER NOTES
HPI Comments: The patient presents to the ED with shortness of breath and chest tightness. Symptoms began last night. She has hx asthma with no admissions. She notes using her Xopenex with only mild improvement of symptoms. She has no fever. She has 3/10 chest pain. Pain is in the middle of her chest and described as a tightness. She has non-productive cough. She denies any n/v/d or abdominal pain. She does have Factor V Leiden deficiency and hx DVT. She notes L calf/leg pain since yesterday. She had a 5 hr car trip last week. She is on chronic pain medications. She has no other concerns at this time. Her  drove her to the ED. Patient is a 59 y.o. female presenting with shortness of breath. The history is provided by the patient. Shortness of Breath   Associated symptoms include wheezing. Pertinent negatives include no fever, no headaches, no sore throat, no cough, no chest pain, no vomiting, no abdominal pain and no rash.         Past Medical History:   Diagnosis Date    Arthritis     Asthma     Cancer ([address] 2006    MELANOMA    Chronic pain     Constipation     Embolism - blood clot 2011    Gallbladder polyp     Gastro-oesophageal reflux disease with hiatal hernia     GERD (gastroesophageal reflux disease)     Hypertension     Liver disease     ELEVATED LIVER ENZYMES 2011    Melanoma [facility])     Neurological disorder     parkison's disease    Osteoporosis     Other ill-defined conditions     factor 5    Parkinson's disease     Seizures ([address]     FEBRILE SEIZURES AS A CHILD    Spinal stenosis of lumbar region     Steroid-induced avascular necrosis of hip ([address]        Past Surgical History:   Procedure Laterality Date    HX APPENDECTOMY  1961    HX GI      COLONSCOPY/EGD    HX ORTHOPAEDIC       left hip 7/2011    HX TONSILLECTOMY      [place] ANESTH,HIP JOINT SURGERY  07/18/2011    left hip surgery         Family History:   Problem Relation Age of Onset    Dementia Mother      AD Lewy Bodies    Hypertension Father     Asthma Father     Other Father      CONFUSION/ B/P DROPS    Cancer Paternal Aunt      STOMACH CA    Alcohol abuse Maternal Grandfather     Diabetes Paternal Grandfather     Heart Disease Paternal Grandfather      MI    Stroke Paternal Grandfather     Cancer Paternal Aunt      LEUKEMIA       Social History     Social History    Marital status:      Spouse name: N/A    Number of children: N/A    Years of education: N/A     Occupational History    Not on file. Social History Main Topics    Smoking status: Never Smoker    Smokeless tobacco: Never Used    Alcohol use Yes      Comment: 3-5 GLASSES WINE WEEKLY    Drug use: No    Sexual activity: Not on file     Other Topics Concern    Not on file     Social History Narrative         ALLERGIES: Ciprofloxacin; Oxycontin [oxycodone]; Sulfa (sulfonamide antibiotics); and Zocor [simvastatin]    Review of Systems   Constitutional: Negative for appetite change, chills and fever. HENT: Negative for congestion, nosebleeds and sore throat. Eyes: Negative for pain and discharge. Respiratory: Positive for chest tightness, shortness of breath and wheezing. Negative for cough. Cardiovascular: Negative for chest pain. Gastrointestinal: Negative for abdominal pain, diarrhea, nausea and vomiting. Genitourinary: Negative for dysuria. Musculoskeletal:        L leg pain   Skin: Negative for rash. Neurological: Negative for weakness and headaches. Hematological: Negative for adenopathy. Psychiatric/Behavioral: Negative. All other systems reviewed and are negative. There were no vitals filed for this visit. Physical Exam   Constitutional: She is oriented to person, place, and time. She appears well-developed and well-nourished. She appears distressed (appears to be SOB. ). HENT:   Head: Normocephalic and atraumatic.    Mouth/Throat: Oropharynx is clear and moist.   Eyes: Conjunctivae are normal.   Neck: Normal range of motion. Neck supple. Cardiovascular: Normal rate, regular rhythm and normal heart sounds. Pulmonary/Chest: She is in respiratory distress. She has wheezes (diffuse). RA sats 90-91%   Abdominal: Soft. Bowel sounds are normal. There is no tenderness. Musculoskeletal: Normal range of motion. She exhibits no edema or tenderness. L calf tenderness. No significant swelling noted. Neurological: She is alert and oriented to person, place, and time. Skin: Skin is warm and dry. Psychiatric: She has a normal mood and affect. Her behavior is normal.   Nursing note and vitals reviewed. [facility]  ED Course       Procedures    ED EKG interpretation:  Rhythm: normal sinus rhythm; and regular . Rate (approx.): 99; Axis: normal; P wave: normal; QRS interval: normal ; ST/T wave: non-specific changes; This EKG was interpreted by Jose Luis Helton MD,ED Provider. 6:54 AM  Decreased, but continued wheezing. Plan 2nd neb. She has declined prednisone. Discussed benefit of a short course of steroids. She does not want steroids at this time. A/P:  1. Asthma with exacerbation - duonebs, She declined solu-medrol and predisone because of AVN of her hip. 2. Chest tightness - recent travel, Factor V Leiden deficiency with hx DVT. Plan PE CT  3. L calf pain - hx DVT. Plan doppler    6:58 AM  Change of shift. Care of patient signed over to Dr. Keesha Mark at the bedside. Handoff complete.

## 2017-09-06 NOTE — ED NOTES
The patient was discharged home by Dr. Brennan and Arland Gilford, RN in stable condition, accompanied by spouse. The patient is alert and oriented, is in no respiratory distress. The patient's diagnosis, condition and treatment were explained to patient or parent/guardian. The patient/responsible party expressed understanding. 4 prescriptions given to pt. No work/school note given to pt. A discharge plan has been developed. A  was not involved in the process. Aftercare instructions were given to the patient.

## 2017-09-06 NOTE — DISCHARGE INSTRUCTIONS
Pneumonia: Care Instructions  Your Care Instructions    Pneumonia is an infection of the lungs. Most cases are caused by infections from bacteria or viruses. Pneumonia may be mild or very severe. If it is caused by bacteria, you will be treated with antibiotics. It may take a few weeks to a few months to recover fully from pneumonia, depending on how sick you were and whether your overall health is good. Follow-up care is a key part of your treatment and safety. Be sure to make and go to all appointments, and call your doctor if you are having problems. Its also a good idea to know your test results and keep a list of the medicines you take. How can you care for yourself at home? Take your antibiotics exactly as directed. Do not stop taking the medicine just because you are feeling better. You need to take the full course of antibiotics. Take your medicines exactly as prescribed. Call your doctor if you think you are having a problem with your medicine. Get plenty of rest and sleep. You may feel weak and tired for a while, but your energy level will improve with time. To prevent dehydration, drink plenty of fluids, enough so that your urine is light yellow or clear like water. Choose water and other caffeine-free clear liquids until you feel better. If you have kidney, heart, or liver disease and have to limit fluids, talk with your doctor before you increase the amount of fluids you drink. Take care of your cough so you can rest. A cough that brings up mucus from your lungs is common with pneumonia. It is one way your body gets rid of the infection. But if coughing keeps you from resting or causes severe fatigue and chest-wall pain, talk to your doctor. He or she may suggest that you take a medicine to reduce the cough. Use a vaporizer or humidifier to add moisture to your bedroom. Follow the directions for cleaning the machine. Do not smoke or allow others to smoke around you.  Smoke will make your cough last longer. If you need help quitting, talk to your doctor about stop-smoking programs and medicines. These can increase your chances of quitting for good. Take an over-the-counter pain medicine, such as acetaminophen (Tylenol), ibuprofen (Advil, Motrin), or naproxen (Aleve). Read and follow all instructions on the label. Do not take two or more pain medicines at the same time unless the doctor told you to. Many pain medicines have acetaminophen, which is Tylenol. Too much acetaminophen (Tylenol) can be harmful. If you were given a spirometer to measure how well your lungs are working, use it as instructed. This can help your doctor tell how your recovery is going. To prevent pneumonia in the future, talk to your doctor about getting a flu vaccine (once a year) and a pneumococcal vaccine (one time only for most people). When should you call for help? Call 911 anytime you think you may need emergency care. For example, call if:  You have severe trouble breathing. Call your doctor now or seek immediate medical care if:  You cough up dark brown or bloody mucus (sputum). You have new or worse trouble breathing. You are dizzy or lightheaded, or you feel like you may faint. Watch closely for changes in your health, and be sure to contact your doctor if:  You have a new or higher fever. You are coughing more deeply or more often. You are not getting better after 2 days (48 hours). You do not get better as expected. Where can you learn more? Go to http://deepa-dimitris.info/. Enter [id number] in the search box to learn more about \"Pneumonia: Care Instructions. \"  Current as of: March 25, 2017  Content Version: 11.3  © 6866-7363 [employer]. Care instructions adapted under license by [employer] (which disclaims liability or warranty for this information).  If you have questions about a medical condition or this instruction, always ask your healthcare professional. [employer], [facility] disclaims any warranty or liability for your use of this information. Asthma Attack: Care Instructions  Your Care Instructions    During an asthma attack, the airways swell and narrow. This makes it hard to breathe. Severe asthma attacks can be life-threatening, but you can help prevent them by keeping your asthma under control and treating symptoms before they get bad. Symptoms include being short of breath, having chest tightness, coughing, and wheezing. Noting and treating these symptoms can also help you avoid future trips to the emergency room. The doctor has checked you carefully, but problems can develop later. If you notice any problems or new symptoms, get medical treatment right away. Follow-up care is a key part of your treatment and safety. Be sure to make and go to all appointments, and call your doctor if you are having problems. It's also a good idea to know your test results and keep a list of the medicines you take. How can you care for yourself at home? · Follow your asthma action plan to prevent and treat attacks. If you don't have an asthma action plan, work with your doctor to create one. · Take your asthma medicines exactly as prescribed. Talk to your doctor right away if you have any questions about how to take them. ¨ Use your quick-relief medicine when you have symptoms of an attack. Quick-relief medicine is usually an albuterol inhaler. Some people need to use quick-relief medicine before they exercise. ¨ Take your controller medicine every day, not just when you have symptoms. Controller medicine is usually an inhaled corticosteroid. The goal is to prevent problems before they occur. Don't use your controller medicine to treat an attack that has already started. It doesn't work fast enough to help. ¨ If your doctor prescribed corticosteroid pills to use during an attack, take them exactly as prescribed.  It may take hours for the pills to work, but they may make the episode shorter and help you breathe better. ¨ Keep your quick-relief medicine with you at all times. · Talk to your doctor before using other medicines. Some medicines, such as aspirin, can cause asthma attacks in some people. · If you have a peak flow meter, use it to check how well you are breathing. This can help you predict when an asthma attack is going to occur. Then you can take medicine to prevent the asthma attack or make it less severe. · Do not smoke or allow others to smoke around you. Avoid smoky places. Smoking makes asthma worse. If you need help quitting, talk to your doctor about stop-smoking programs and medicines. These can increase your chances of quitting for good. · Learn what triggers an asthma attack for you, and avoid the triggers when you can. Common triggers include colds, smoke, air pollution, dust, pollen, mold, pets, cockroaches, stress, and cold air. · Avoid colds and the flu. Get a pneumococcal vaccine shot. If you have had one before, ask your doctor if you need a second dose. Get a flu vaccine every fall. If you must be around people with colds or the flu, wash your hands often. When should you call for help? Call 911 anytime you think you may need emergency care. For example, call if:  · You have severe trouble breathing. Call your doctor now or seek immediate medical care if:  · Your symptoms do not get better after you have followed your asthma action plan. · You have new or worse trouble breathing. · Your coughing and wheezing get worse. · You cough up dark brown or bloody mucus (sputum). · You have a new or higher fever. Watch closely for changes in your health, and be sure to contact your doctor if:  · You need to use quick-relief medicine on more than 2 days a week (unless it is just for exercise). · You cough more deeply or more often, especially if you notice more mucus or a change in the color of your mucus.   · You are not getting better as expected. Where can you learn more? Go to http://deepa-dimitris.info/. Enter T968 in the search box to learn more about \"Asthma Attack: Care Instructions. \"  Current as of: March 25, 2017  Content Version: 11.3  © 7108-8950 [employer]. Care instructions adapted under license by [employer] (which disclaims liability or warranty for this information). If you have questions about a medical condition or this instruction, always ask your healthcare professional. [place] [address] any warranty or liability for your use of this information. Chest Pain: Care Instructions  Your Care Instructions  There are many things that can cause chest pain. Some are not serious and will get better on their own in a few days. But some kinds of chest pain need more testing and treatment. Your doctor may have recommended a follow-up visit in the next 8 to 12 hours. If you are not getting better, you may need more tests or treatment. Even though your doctor has released you, you still need to watch for any problems. The doctor carefully checked you, but sometimes problems can develop later. If you have new symptoms or if your symptoms do not get better, get medical care right away. If you have worse or different chest pain or pressure that lasts more than 5 minutes or you passed out (lost consciousness), call 911 or seek other emergency help right away. A medical visit is only one step in your treatment. Even if you feel better, you still need to do what your doctor recommends, such as going to all suggested follow-up appointments and taking medicines exactly as directed. This will help you recover and help prevent future problems. How can you care for yourself at home? · Rest until you feel better. · Take your medicine exactly as prescribed. Call your doctor if you think you are having a problem with your medicine.   · Do not drive after taking a prescription pain medicine. When should you call for help? Call 911 if:  · You passed out (lost consciousness). · You have severe difficulty breathing. · You have symptoms of a heart attack. These may include:  ¨ Chest pain or pressure, or a strange feeling in your chest.  ¨ Sweating. ¨ Shortness of breath. ¨ Nausea or vomiting. ¨ Pain, pressure, or a strange feeling in your back, neck, jaw, or upper belly or in one or both shoulders or arms. ¨ Lightheadedness or sudden weakness. ¨ A fast or irregular heartbeat. After you call 911, the  may tell you to chew 1 adult-strength or 2 to 4 low-dose aspirin. Wait for an ambulance. Do not try to drive yourself. Call your doctor today if:  · You have any trouble breathing. · Your chest pain gets worse. · You are dizzy or lightheaded, or you feel like you may faint. · You are not getting better as expected. · You are having new or different chest pain. Where can you learn more? Go to http://deepa-dimitris.info/. Enter A120 in the search box to learn more about \"Chest Pain: Care Instructions. \"  Current as of: March 20, 2017  Content Version: 11.3  © 4338-9702 [employer]. Care instructions adapted under license by [employer] (which disclaims liability or warranty for this information). If you have questions about a medical condition or this instruction, always ask your healthcare professional. [place] [address] any warranty or liability for your use of this information. Leg Pain: Care Instructions  Your Care Instructions  Many things can cause leg pain. Too much exercise or overuse can cause a muscle cramp (or charley horse). You can get leg cramps from not eating a balanced diet that has enough potassium, calcium, and other minerals. If you do not drink enough fluids or are taking certain medicines, you may develop leg cramps.  Other causes of leg pain include injuries, blood flow problems, nerve damage, and twisted and enlarged veins (varicose veins). You can usually ease pain with self-care. Your doctor may recommend that you rest your leg and keep it elevated. Follow-up care is a key part of your treatment and safety. Be sure to make and go to all appointments, and call your doctor if you are having problems. Its also a good idea to know your test results and keep a list of the medicines you take. How can you care for yourself at home? · Take pain medicines exactly as directed. ¨ If the doctor gave you a prescription medicine for pain, take it as prescribed. ¨ If you are not taking a prescription pain medicine, ask your doctor if you can take an over-the-counter medicine. · Take any other medicines exactly as prescribed. Call your doctor if you think you are having a problem with your medicine. · Rest your leg while you have pain, and avoid standing for long periods of time. · Prop up your leg at or above the level of your heart when possible. · Make sure you are eating a balanced diet that is rich in calcium, potassium, and magnesium, especially if you are pregnant. · If directed by your doctor, put ice or a cold pack on the area for 10 to 20 minutes at a time. Put a thin cloth between the ice and your skin. · Your leg may be in a splint, a brace, or an elastic bandage, and you may have crutches to help you walk. Follow your doctor's directions about how long to wear supports and how to use the crutches. When should you call for help? Call 911 anytime you think you may need emergency care. For example, call if:  · You have sudden chest pain and shortness of breath, or you cough up blood. · Your leg is cool or pale or changes color. Call your doctor now or seek immediate medical care if:  · You have increasing or severe pain. · Your leg suddenly feels weak and you cannot move it. · You have signs of a blood clot, such as:  ¨ Pain in your calf, back of the knee, thigh, or groin.   ¨ Redness and swelling in your leg or groin. · You have signs of infection, such as:  ¨ Increased pain, swelling, warmth, or redness. ¨ Red streaks leading from the sore area. ¨ Pus draining from a place on your leg. ¨ A fever. · You cannot bear weight on your leg. Watch closely for changes in your health, and be sure to contact your doctor if:  · You do not get better as expected. Where can you learn more? Go to http://deepa-dimitris.info/. Enter S992 in the search box to learn more about \"Leg Pain: Care Instructions. \"  Current as of: March 20, 2017  Content Version: 11.3  © 7485-8640 [employer]. Care instructions adapted under license by [employer]ac[employer] by [employer] (which disclaims liability or warranty for this information). If you have questions about a medical condition or this instruction, always ask your healthcare professional. [address] any warranty or liability for your use of this information.

## 2017-09-20 ENCOUNTER — TELEPHONE (OUTPATIENT)
Dept: NEUROLOGY | Age: 62
End: 2017-09-20

## 2017-09-20 DIAGNOSIS — G25.81 RESTLESS LEG SYNDROME, UNCONTROLLED: Primary | ICD-10-CM

## 2017-09-20 NOTE — TELEPHONE ENCOUNTER
----- Message from 56Derek Randall sent at 9/20/2017 11:23 AM EDT -----  Regarding: Dr. Garves Mask: [phone]  Michael Forrest is requesting to have the rx previously discussed for REM sleep, she believes the name Clazipam pt uses [address] [place] [address] on file. She has advised she has stopped taking Opana, and has started a Butran patch.  PT is leaving [facility] 9.21.17

## 2017-09-20 NOTE — TELEPHONE ENCOUNTER
Let pt know that it's not a good idea for her to be on Clonazepam (or any other benzodiazepine, sedating medication) when she's on strong pain medications (Butrans). If she needs something for her RLS, then we would need to try the extended release version of Gabapentin (horizant). I'll send that Rx, it may need a prior auth.

## 2018-02-05 ENCOUNTER — OFFICE VISIT (OUTPATIENT)
Dept: NEUROLOGY | Age: 63
End: 2018-02-05

## 2018-02-05 ENCOUNTER — DOCUMENTATION ONLY (OUTPATIENT)
Dept: NEUROLOGY | Age: 63
End: 2018-02-05

## 2018-02-05 VITALS
BODY MASS INDEX: 33.99 KG/M2 | WEIGHT: 180 LBS | SYSTOLIC BLOOD PRESSURE: 124 MMHG | HEART RATE: 96 BPM | OXYGEN SATURATION: 96 % | DIASTOLIC BLOOD PRESSURE: 80 MMHG | HEIGHT: 61 IN

## 2018-02-05 DIAGNOSIS — G25.81 RESTLESS LEG SYNDROME, UNCONTROLLED: ICD-10-CM

## 2018-02-05 DIAGNOSIS — G20 PARKINSON DISEASE (HCC): Primary | ICD-10-CM

## 2018-02-05 RX ORDER — ROPINIROLE 6 MG/1
TABLET, FILM COATED, EXTENDED RELEASE ORAL EVERY EVENING
COMMUNITY

## 2018-02-05 RX ORDER — GABAPENTIN 100 MG/1
200 CAPSULE ORAL 4 TIMES DAILY
COMMUNITY
End: 2018-02-05

## 2018-02-05 RX ORDER — GUAIFENESIN AND PHENYLEPHRINE HCL 400; 10 MG/1; MG/1
TABLET ORAL
COMMUNITY

## 2018-02-05 RX ORDER — CARBIDOPA AND LEVODOPA 25; 100 MG/1; MG/1
1 TABLET ORAL 4 TIMES DAILY
COMMUNITY
Start: 2017-12-11

## 2018-02-05 NOTE — MR AVS SNAPSHOT
[facility] [address] [facility] [id number] [phone]     Patient: Nicki Lennox  MRN: [id number]  XOK:[id number]               Visit Information     Date & Time Provider Department Dept. Phone Encounter #    2/5/2018 11:40 AM Sheldon Hogan MD [address] [phone] [id number]      Upcoming Health Maintenance        Date Due    Hepatitis C Screening 1955    Pneumococcal 19-64 Medium Risk (1 of 1 - PPSV23) 9/10/1972    DTaP/Tdap/Td series (1 - Tdap) 9/10/1974    PAP AKA CERVICAL CYTOLOGY 9/10/1974    FOBT Q 1 YEAR AGE 50-75 9/10/2003    ZOSTER VACCINE AGE 60> 7/10/2013    Influenza Age 9 to Adult 8/1/2017    BREAST CANCER SCRN MAMMOGRAM 12/14/2018      Allergies as of 2/5/2018  Review Complete On: 2/5/2018 By: Leyda Lee LPN       Severity Noted Reaction Type Reactions    Ciprofloxacin  08/18/2015    Rash    Oxycontin [Oxycodone]  06/22/2011    Shortness of Breath, Itching    Sulfa (Sulfonamide Antibiotics)  06/22/2011    Rash    Zocor [Simvastatin]  08/18/2015    Myalgia      Current Immunizations  Reviewed on 7/20/2011    No immunizations on file.        Not reviewed this visit   You Were Diagnosed With        Codes Comments    Parkinson disease ([facility] [address]    -  Primary ICD-10-CM: G20  ICD-9-CM: 332.0     Restless leg syndrome, uncontrolled     ICD-10-CM: G25.81  ICD-9-CM: 333.94       Vitals     BP Pulse Height(growth percentile) Weight(growth percentile) SpO2 BMI    124/80 (BP 1 Location: Left arm, BP Patient Position: Sitting) 96 5' 1\" (1.549 m) 180 lb (81.6 kg) 96% 34.01 kg/m2    OB Status Smoking Status                Postmenopausal Never Smoker          BMI and BSA Data     Body Mass Index Body Surface Area    34.01 kg/m 2 1.87 m 2         Preferred Pharmacy       Pharmacy Name Phone    [facility] [place] - [address] [phone]         Your Updated Medication List          This list is accurate as of: 2/5/18 12:08 PM.  Always use your most recent med list.                AFRIN SINUS (OXYMETAZOLINE) 0.05 % nasal spray   Generic drug:  oxymetazoline   2 Sprays by Both Nostrils route two (2) times a day. albuterol 90 mcg/actuation inhaler   Commonly known as:  PROVENTIL HFA, VENTOLIN HFA, PROAIR HFA   Take  by inhalation. ALEVE PO   Take 440 mg by mouth two (2) times a day. aspirin 325 mg tablet   Commonly known as:  ASPIRIN   Take 325 mg by mouth daily. budesonide 180 mcg/actuation Aepb inhaler   Commonly known as:  PULMICORT   Take  by inhalation two (2) times a day. CALCIUM 600 + D 600-125 mg-unit Tab   Generic drug:  calcium-cholecalciferol (d3)   Take  by mouth.       carbidopa-levodopa  mg per tablet   Commonly known as:  SINEMET   Take 1 Tab by mouth four (4) times daily. CRESTOR 5 mg tablet   Generic drug:  rosuvastatin   Take  by mouth nightly.       gabapentin enacarbil 600 mg Tber   Commonly known as:  HORIZANT   Take 600 mg by mouth daily (with dinner) for 30 days. Indications: Restless Legs Syndrome       hydroCHLOROthiazide 25 mg tablet   Commonly known as:  HYDRODIURIL   Take 25 mg by mouth daily. lansoprazole 30 mg disintegrating tablet   Commonly known as:  PREVACID SOLUTAB   Take  by mouth daily. levalbuterol 0.63 mg/3 mL Nebu   Commonly known as:  XOPENEX   3 mL by Nebulization route every six (6) hours as needed. LIPITOR 10 mg tablet   Generic drug:  atorvastatin   Take  by mouth daily. LORazepam 0.5 mg tablet   Commonly known as:  ATIVAN   Take  by mouth every four (4) hours as needed. MIRALAX 17 gram packet   Generic drug:  polyethylene glycol   Take 17 g by mouth daily. Nebulizer & Compressor machine   1 Each by Does Not Apply route four (4) times daily as needed. NEILMED SINUS RINSE COMPLETE NA   by Nasal route.        OPANA ER 10 mg tablet   Generic drug:  oxyMORphone   Take 10 mg by mouth every twelve (12) hours. rasagiline 1 mg tablet   Commonly known as:  AZILECT   Take 1 Tab by mouth daily. REQUIP XL 6 mg Tb24   Generic drug:  rOPINIRole   Take  by mouth. RESTASIS 0.05 % ophthalmic emulsion   Generic drug:  cycloSPORINE   Administer 1 Drop to both eyes two (2) times a day. turmeric root extract 500 mg Cap   Take  by mouth. VITAMIN D2 50,000 unit capsule   Generic drug:  ergocalciferol   Take 50,000 Units by mouth every seven (7) days. Prescriptions Printed        Refills    gabapentin enacarbil (HORIZANT) 600 mg TbER 2    Sig: Take 600 mg by mouth daily (with dinner) for 30 days. Indications: Restless Legs Syndrome    Class: Print    Route: Oral      We Performed the Following     REFERRAL TO NEUROLOGY [WJK17 Custom]     Comments:    57 y.o. female with Parkinson's. Referring for 2nd opinion, optimal med regimen. See attached notes. To-Do List     02/13/2018 2:15 PM   (Arrive by 2:00 PM)     Appointment with [facility] at [facility] ([phone])   Shower or bathe using soap and water. Do not use deodorant, powder, perfumes, or lotion the day of your exam.  If your prior mammograms were not performed at [facility] 6 please bring films with you or forward prior images 2 days before your procedure. Check in at registration 15min before your appointment time unless you were instructed to do otherwise. A script is not necessary, but if you have one, please bring it on the day of the mammogram or have it faxed to the department.   [facility] [phone] [facility]  [phone] [facility] [phone] [facility]  [phone] [facility] [phone] [facility] 0547 [facility] [phone]   Please arrive 15 minutes prior to appointment to register         Referral Information     Referral ID Referred By Referred To       [id number] Marika 5115 MD Noah Swift       [address] [address]       [facility], [address] Phone: [phone]       Fax: [phone]         Visits Status Start Date End Date    1 New Request 2/5/18 2/5/19    If your referral has a status of pending review or denied, additional information will be sent to support the outcome of this decision. Introducing [facility] & HEALTH SERVICES! Dear Casie Armenta:  Thank you for requesting a [employer] account. Our records indicate that you already have an active [employer] account. You can access your account anytime at [email or web address]     Did you know that you can access your hospital and ER discharge instructions at any time in [employer]? You can also review all of your test results from your hospital stay or ER visit. Additional Information    If you have questions, please visit the Frequently Asked Questions section of the [employer] website at [email or web address] Remember, [employer] is NOT to be used for urgent needs. For medical emergencies, dial 911. Now available from your iPhone and Android! Please provide this summary of care documentation to your next provider. Your primary care clinician is listed as [place]. If you have any questions after today's visit, please call [phone].

## 2018-02-05 NOTE — PROGRESS NOTES
Interval HPI:   This is a 57 y.o. female with Parkinson's who's following up for:     Chief Complaint   Patient presents with    Parkinsons Disease     Interval Hx:    Current meds:   Requip XL 6 mg/ AM, Sinemet one tab QID (6 AM, 9 AM, 2 PM, 6 PM), Azilect 1 mg/ AM.   Higher doses of requip caused her significant dyskinesias. Lower doses of requip or sinemet worsen her parkinson's (feels stiffer, gait seems more shuffling). Has not had tremors for a long time. Called back after last visit complaining of sleep disturbance (has hx of REM sleep disorder) and requested to restart Clonazepam.  Declined that Rx because she was on strong opioid medications (was on Opana which was switched to Leann), with the concern being for oversedation. Offered to put her on Horizant (gabapentin encarbil) for both the REM and RLS symptoms. Pt reports that she had been taking 100 mg capsules, 2 caps QID for chronic bilateral legs pains (hip, bursitis). Her RLS symptoms haven't been controlled on the immediate release Gabapentin and she now wants to try the ER gabapentin to see if it addresses this problem as well as her chronic leg pains. Parkinson's related complaints: worsening handwriting, worsening balance/ stumbles more frequently (couple days a week). If taking her meds, no shuffling, if misses her meds, starts shuffling. Sleep disturbance; says sleep is better than when she called back in Sept 2017. She's not kicking, screaming, or acting out any dreams, but she has recorded audio of herself during sleep and says she might groan a couple times a night but doesn't affect her sleep. In the past 4-6 months, she's been feeling like there's something in her right peripheral vision, like an animal but when she looks in that direction it's gone.    No hx of macular degeneration.        =============================================  Brief Hx:   Initially seen by Dr Fiona Olsen in June 2011 for Parkinson's/ mild at that time. Was on Requip XL 12 mg/ day and Azilect 1 mg TID. At a follow up visit Nov 2011 she described some worsening symptoms after a recent surgery, so Stalevo 50 mg BID added. Pt lost to follow up for 2 years at when she returned said she never tried the Stalevo. In Dec 2013 she reported increased fatigue, \"slowed down a lot,\" worsening handwriting, lower voice. Added Stalevo 12.5 mg TID. At follow up that was increased to 50 mg BID, Requip XL and Azilect continued at same doses. At follow up in Dec 2014, reported that when on Stalevo she has better mobility. Went to see [facility]/ Dr Lynne Lau and she says the told her to stop the Stalevo and restart Sinemet  TID. She reported doing better overall at that point. Sometime between April and June 2015, insurance denied her Requip XL and she had to take Requip 2 mg TID. She called back saying she had abnormal body movements on the immediate release formulation of Requip and requested to go back to the XL version as didn't have SEFx with that that she could recall. Patient believes she restarted the Requip XL but EMR still has her listed as taking Requip 2 mg TID. She continued the Sinemet  TID and the Azilect 1 mg daily. Last visit note from January 2016 indicates that she wasn't having any tremors and her gait was normal on this regimen of medications so it was continued. At visit in May 2016, pt was c/o frequent body motions, can't sit still, rocks while sitting or when standing seems to move side to side. Appeared restless in office and  / patient agree that what she's trying to describe does seem like body restlessness. No abnormal facial movements. Says after re-starting sinemet (Dec 2014) her daughter noted that her tongue kept sticking in/ out, but that hasn't happened in a while. Not on any antipsychotics or antidepressants. Was taking Lorazepam 0.5 mg every 4 hours prn.   Pt was unclear what formulation/ dose Requip she was taking. Called back and let office know it was Requip XL 12 mg/ day. We gradually tapered it down to Requip XL 6 mg once a day and the akithisia resolved. Brief ROS: as above or otherwise negative  There have been no significant changes in PMHx, PSHx, SHx except as noted above. Allergies   Allergen Reactions    Ciprofloxacin Rash    Oxycontin [Oxycodone] Shortness of Breath and Itching    Sulfa (Sulfonamide Antibiotics) Rash    Zocor [Simvastatin] Myalgia     Current Outpatient Prescriptions   Medication Sig Dispense Refill    carbidopa-levodopa (SINEMET)  mg per tablet Take 1 Tab by mouth four (4) times daily.  turmeric root extract 500 mg cap Take  by mouth.  rOPINIRole (REQUIP XL) 6 mg Tb24 Take  by mouth.  gabapentin enacarbil (HORIZANT) 600 mg TbER Take 600 mg by mouth daily (with dinner) for 30 days. Indications: Restless Legs Syndrome 30 Tab 2    rasagiline (AZILECT) 1 mg tablet Take 1 Tab by mouth daily. 90 Tab 1    albuterol (PROVENTIL HFA, VENTOLIN HFA, PROAIR HFA) 90 mcg/actuation inhaler Take  by inhalation.  atorvastatin (LIPITOR) 10 mg tablet Take  by mouth daily.  oxymetazoline (AFRIN SINUS, OXYMETAZOLINE,) 0.05 % nasal spray 2 Sprays by Both Nostrils route two (2) times a day.  cycloSPORINE (RESTASIS) 0.05 % ophthalmic emulsion Administer 1 Drop to both eyes two (2) times a day.  OxyMORPhone (OPANA ER) 10 mg tablet Take 10 mg by mouth every twelve (12) hours.  calcium-cholecalciferol, d3, (CALCIUM 600 + D) 600-125 mg-unit Tab Take  by mouth.  SODIUM CHLORIDE/SODIUM BICARB (NEILMED SINUS RINSE COMPLETE NA) by Nasal route.  polyethylene glycol (MIRALAX) 17 gram packet Take 17 g by mouth daily.  aspirin (ASPIRIN) 325 mg tablet Take 325 mg by mouth daily.  NAPROXEN SODIUM (ALEVE PO) Take 440 mg by mouth two (2) times a day.       ergocalciferol (VITAMIN D) 50,000 unit capsule Take 50,000 Units by mouth every seven (7) days.  lansoprazole (PREVACID SOLUTAB) 30 mg disintegrating tablet Take  by mouth daily.  hydrochlorothiazide (HYDRODIURIL) 25 mg tablet Take 25 mg by mouth daily.  budesonide (PULMICORT) 180 mcg/Inhalation AePB inhaler Take  by inhalation two (2) times a day.  lorazepam (ATIVAN) 0.5 mg tablet Take  by mouth every four (4) hours as needed.  Nebulizer & Compressor machine 1 Each by Does Not Apply route four (4) times daily as needed. 1 Each 0    levalbuterol (XOPENEX) 0.63 mg/3 mL nebu 3 mL by Nebulization route every six (6) hours as needed. 20 Package 0    rosuvastatin (CRESTOR) 5 mg tablet Take  by mouth nightly. Physical Exam  Blood pressure 124/80, pulse 96, height 5' 1\" (1.549 m), weight 81.6 kg (180 lb), SpO2 96 %. NAD, resting, no noticeable restlessness/ akithisia, no tremors  Neck: no stiffness  Skin: no rashes    Focused Neurological Exam     Mental status: Alert and oriented to person, place situation. Language: normal fluency and comprehension; no dysarthria; no hypophonic voice      CNs:   Visual fields grossly normal  Extraocular movements intact, no nystagmus  Face appears symmetric and facial strength normal.    Hearing is intact to casual conversation. Sensory: intact light touch in all 4 extremities  Motor: Normal bulk and strength in all 4 extremities. Cerebellar/ Extrapyramidal:   no resting or postural tremors  no cog-wheeling     Reflexes: 2+ patellars    Gait: normal speed, no shuffling, reduced left arm swing/ tilts shoulders to right, normal right arm swing, no en-bloc turning     Impression      ICD-10-CM ICD-9-CM    1.  Parkinson disease ([facility] [address] G20 332.0 REFERRAL TO NEUROLOGY   2. Restless leg syndrome, uncontrolled G25.81 333.94 gabapentin enacarbil (HORIZANT) 600 mg TbER      REFERRAL TO NEUROLOGY        Pt feels majority of Parkinson's symptoms are under control but asks if she should see Parkinson's specialist for review of her meds and any suggestions on med changes to optimize her management. She had seen [facility] in 2014-5 but didn't like the interaction she had at that time. She's open to returning there if she can see Dr Jasson Pearson but was also open to seeing a Parkinson's specialist in the community. We agreed that I'd refer her to Dr [facility] for review of her med regimen and discussion of parkinson's related symptoms.      For now, continue Azilect 1 mg/ day, Requip XL 6 mg qAM, and Sinemet 1 tab QID  Will try Horizant 600 mg with dinner, instead of the Gabapentin 200 mg QID for her RLS symptoms  Will need Prior Auth on the Horizant     Signed By: Cheryln Meigs, MD     February 5, 2018

## 2018-02-13 ENCOUNTER — HOSPITAL ENCOUNTER (OUTPATIENT)
Dept: MAMMOGRAPHY | Age: 63
Discharge: HOME | End: 2018-02-13
Attending: INTERNAL MEDICINE
Payer: MEDICARE

## 2018-02-13 DIAGNOSIS — Z12.39 BREAST SCREENING: ICD-10-CM

## 2018-02-13 PROCEDURE — 77067 SCR MAMMO BI INCL CAD: CPT

## 2018-08-23 RX ORDER — ROPINIROLE 6 MG/1
TABLET, FILM COATED, EXTENDED RELEASE ORAL
Qty: 30 TAB | Refills: 0 | OUTPATIENT
Start: 2018-08-23

## 2018-09-19 RX ORDER — CARBIDOPA AND LEVODOPA 25; 100 MG/1; MG/1
TABLET ORAL
Qty: 360 TAB | Refills: 0 | OUTPATIENT
Start: 2018-09-19

## 2018-09-19 NOTE — TELEPHONE ENCOUNTER
Call patient and see if she's now being followed by Dr Rhea Marcus parkinsons specialist.  If so, he will need to refill her parkinson's meds.   Thanks

## 2018-10-18 ENCOUNTER — HOSPITAL ENCOUNTER (OUTPATIENT)
Dept: GENERAL RADIOLOGY | Age: 63
Discharge: HOME | End: 2018-10-18
Attending: INTERNAL MEDICINE
Payer: MEDICARE

## 2018-10-18 DIAGNOSIS — R52 PAIN: ICD-10-CM

## 2018-10-18 PROCEDURE — 73630 X-RAY EXAM OF FOOT: CPT

## 2018-10-18 PROCEDURE — 73610 X-RAY EXAM OF ANKLE: CPT

## 2019-03-19 ENCOUNTER — HOSPITAL ENCOUNTER (OUTPATIENT)
Dept: MAMMOGRAPHY | Age: 64
Discharge: HOME | End: 2019-03-19
Attending: INTERNAL MEDICINE
Payer: MEDICARE

## 2019-03-19 ENCOUNTER — APPOINTMENT (OUTPATIENT)
Dept: MAMMOGRAPHY | Age: 64
End: 2019-03-19
Attending: INTERNAL MEDICINE
Payer: MEDICARE

## 2019-03-19 DIAGNOSIS — Z12.39 SCREENING BREAST EXAMINATION: ICD-10-CM

## 2019-03-19 PROCEDURE — 77067 SCR MAMMO BI INCL CAD: CPT

## 2019-04-08 ENCOUNTER — HOSPITAL ENCOUNTER (OUTPATIENT)
Dept: MAMMOGRAPHY | Age: 64
Discharge: HOME | End: 2019-04-08
Attending: INTERNAL MEDICINE
Payer: MEDICARE

## 2019-04-08 DIAGNOSIS — M85.89 OTHER SPECIFIED DISORDERS OF BONE DENSITY AND STRUCTURE, MULTIPLE SITES: ICD-10-CM

## 2019-04-08 PROCEDURE — 77080 DXA BONE DENSITY AXIAL: CPT

## 2019-05-10 ENCOUNTER — HOSPITAL ENCOUNTER (OUTPATIENT)
Dept: MRI IMAGING | Age: 64
Discharge: HOME | End: 2019-05-10
Attending: PSYCHIATRY & NEUROLOGY
Payer: MEDICARE

## 2019-05-10 DIAGNOSIS — R53.1 LEFT-SIDED WEAKNESS: ICD-10-CM

## 2019-05-10 PROCEDURE — 70551 MRI BRAIN STEM W/O DYE: CPT

## 2020-11-06 ENCOUNTER — HOSPITAL ENCOUNTER (EMERGENCY)
Age: 65
Discharge: HOME | End: 2020-11-06
Attending: EMERGENCY MEDICINE
Payer: MEDICARE

## 2020-11-06 ENCOUNTER — APPOINTMENT (OUTPATIENT)
Dept: ULTRASOUND IMAGING | Age: 65
End: 2020-11-06
Attending: EMERGENCY MEDICINE
Payer: MEDICARE

## 2020-11-06 VITALS
OXYGEN SATURATION: 95 % | WEIGHT: 174.16 LBS | HEART RATE: 97 BPM | HEIGHT: 61 IN | TEMPERATURE: 96.7 F | RESPIRATION RATE: 18 BRPM | SYSTOLIC BLOOD PRESSURE: 134 MMHG | DIASTOLIC BLOOD PRESSURE: 90 MMHG | BODY MASS INDEX: 32.88 KG/M2

## 2020-11-06 DIAGNOSIS — M79.10 MYALGIA: ICD-10-CM

## 2020-11-06 DIAGNOSIS — R06.02 SHORTNESS OF BREATH: Primary | ICD-10-CM

## 2020-11-06 LAB
ALBUMIN SERPL-MCNC: 3.8 G/DL (ref 3.5–5)
ALBUMIN/GLOB SERPL: 1.2 {RATIO} (ref 1.1–2.2)
ALP SERPL-CCNC: 106 U/L (ref 45–117)
ALT SERPL-CCNC: 9 U/L (ref 12–78)
ANION GAP SERPL CALC-SCNC: 6 MMOL/L (ref 5–15)
AST SERPL-CCNC: 17 U/L (ref 15–37)
BASOPHILS # BLD: 0 K/UL (ref 0–0.1)
BASOPHILS NFR BLD: 0 % (ref 0–1)
BILIRUB SERPL-MCNC: 0.6 MG/DL (ref 0.2–1)
BUN SERPL-MCNC: 20 MG/DL (ref 6–20)
BUN/CREAT SERPL: 21 (ref 12–20)
CALCIUM SERPL-MCNC: 9 MG/DL (ref 8.5–10.1)
CHLORIDE SERPL-SCNC: 102 MMOL/L (ref 97–108)
CO2 SERPL-SCNC: 33 MMOL/L (ref 21–32)
CREAT SERPL-MCNC: 0.94 MG/DL (ref 0.55–1.02)
D DIMER PPP FEU-MCNC: 0.54 MG/L FEU (ref 0–0.65)
DIFFERENTIAL METHOD BLD: NORMAL
EOSINOPHIL # BLD: 0.2 K/UL (ref 0–0.4)
EOSINOPHIL NFR BLD: 3 % (ref 0–7)
ERYTHROCYTE [DISTWIDTH] IN BLOOD BY AUTOMATED COUNT: 12.8 % (ref 11.5–14.5)
GLOBULIN SER CALC-MCNC: 3.2 G/DL (ref 2–4)
GLUCOSE SERPL-MCNC: 93 MG/DL (ref 65–100)
HCT VFR BLD AUTO: 40.4 % (ref 35–47)
HGB BLD-MCNC: 12.8 G/DL (ref 11.5–16)
IMM GRANULOCYTES # BLD AUTO: 0 K/UL (ref 0–0.04)
IMM GRANULOCYTES NFR BLD AUTO: 0 % (ref 0–0.5)
LYMPHOCYTES # BLD: 1 K/UL (ref 0.8–3.5)
LYMPHOCYTES NFR BLD: 14 % (ref 12–49)
MCH RBC QN AUTO: 29.7 PG (ref 26–34)
MCHC RBC AUTO-ENTMCNC: 31.7 G/DL (ref 30–36.5)
MCV RBC AUTO: 93.7 FL (ref 80–99)
MONOCYTES # BLD: 0.6 K/UL (ref 0–1)
MONOCYTES NFR BLD: 8 % (ref 5–13)
NEUTS SEG # BLD: 5.4 K/UL (ref 1.8–8)
NEUTS SEG NFR BLD: 74 % (ref 32–75)
NRBC # BLD: 0 K/UL (ref 0–0.01)
NRBC BLD-RTO: 0 PER 100 WBC
PLATELET # BLD AUTO: 208 K/UL (ref 150–400)
PMV BLD AUTO: 11.4 FL (ref 8.9–12.9)
POTASSIUM SERPL-SCNC: 3.4 MMOL/L (ref 3.5–5.1)
PROT SERPL-MCNC: 7 G/DL (ref 6.4–8.2)
RBC # BLD AUTO: 4.31 M/UL (ref 3.8–5.2)
SODIUM SERPL-SCNC: 141 MMOL/L (ref 136–145)
TROPONIN I SERPL-MCNC: <0.05 NG/ML
WBC # BLD AUTO: 7.2 K/UL (ref 3.6–11)

## 2020-11-06 PROCEDURE — 80053 COMPREHEN METABOLIC PANEL: CPT

## 2020-11-06 PROCEDURE — 84484 ASSAY OF TROPONIN QUANT: CPT

## 2020-11-06 PROCEDURE — 99284 EMERGENCY DEPT VISIT MOD MDM: CPT

## 2020-11-06 PROCEDURE — 93005 ELECTROCARDIOGRAM TRACING: CPT

## 2020-11-06 PROCEDURE — 93970 EXTREMITY STUDY: CPT

## 2020-11-06 PROCEDURE — 36415 COLL VENOUS BLD VENIPUNCTURE: CPT

## 2020-11-06 PROCEDURE — 85379 FIBRIN DEGRADATION QUANT: CPT

## 2020-11-06 PROCEDURE — 85025 COMPLETE CBC W/AUTO DIFF WBC: CPT

## 2020-11-06 NOTE — ED TRIAGE NOTES
Pt ambulates to treatment area she states that since last night she has had some SOB that is different than her asthma SOB. She used her nebulizer last night but it did not seem to help for very long. She also has some bilateral calf cramping that seems to be worse in the right calf. She also for 2 weeks has had mid back pain along her bra line that is also intermittent.   Dr Derek Tamayo at the bedside

## 2020-11-06 NOTE — ED PROVIDER NOTES
The history is provided by the patient. Leg Pain    This is a new problem. The current episode started more than 2 days ago. The problem occurs constantly. The problem has not changed since onset. The pain is present in the left lower leg and right lower leg. The quality of the pain is described as constant. The pain is moderate. Pertinent negatives include no numbness, full range of motion, no stiffness, no tingling, no itching, no back pain and no neck pain. She has tried nothing for the symptoms. The treatment provided no relief. There has been no history of extremity trauma. Shortness of Breath   This is a chronic problem. The problem occurs continuously. The current episode started yesterday. The problem has not changed since onset. Associated symptoms include wheezing and leg pain. Pertinent negatives include no fever, no headaches, no coryza, no rhinorrhea, no sore throat, no swollen glands, no ear pain, no neck pain, no cough, no sputum production, no PND, no orthopnea, no chest pain, no syncope, no vomiting, no abdominal pain, no rash, no leg swelling and no claudication. She has tried ipratropium inhalers and inhaled steroids for the symptoms. The treatment provided no relief. She has had no prior hospitalizations. She has had prior ED visits. Associated medical issues include asthma and DVT.         Past Medical History:   Diagnosis Date    Arthritis     Asthma     Cancer ([facility] [address] 2006    MELANOMA    Chronic pain     Constipation     Embolism - blood clot 2011    Gallbladder polyp     Gastro-oesophageal reflux disease with hiatal hernia     GERD (gastroesophageal reflux disease)     Hypertension     Liver disease     ELEVATED LIVER ENZYMES 2011    Melanoma [facility])     Neurological disorder     parkison's disease    Osteoporosis     Other ill-defined conditions(199.89)     factor 5    Parkinson's disease     Seizures ([facility] [address]     FEBRILE SEIZURES AS A CHILD    Spinal stenosis of lumbar region  Steroid-induced avascular necrosis of hip (HCC)        Past Surgical History:   Procedure Laterality Date    HX APPENDECTOMY  1961    HX GI      COLONSCOPY/EGD    HX ORTHOPAEDIC       left hip 7/2011    HX TONSILLECTOMY      [place] ANESTH,HIP JOINT SURGERY  07/18/2011    left hip surgery         Family History:   Problem Relation Age of Onset    Dementia Mother         AD Lewy Bodies    Hypertension Father     Asthma Father     Other Father         CONFUSION/ B/P DROPS    Cancer Paternal Aunt         STOMACH CA    Alcohol abuse Maternal Grandfather     Diabetes Paternal Grandfather     Heart Disease Paternal Grandfather         MI    Stroke Paternal Grandfather     Cancer Paternal Aunt         LEUKEMIA       Social History     Socioeconomic History    Marital status:      Spouse name: Not on file    Number of children: Not on file    Years of education: Not on file    Highest education level: Not on file   Occupational History    Not on file   Social Needs    Financial resource strain: Not on file    Food insecurity     Worry: Not on file     Inability: Not on file    Transportation needs     Medical: Not on file     Non-medical: Not on file   Tobacco Use    Smoking status: Never Smoker    Smokeless tobacco: Never Used   Substance and Sexual Activity    Alcohol use: Yes     Comment: 3-5 GLASSES WINE WEEKLY    Drug use: No    Sexual activity: Not on file   Lifestyle    Physical activity     Days per week: Not on file     Minutes per session: Not on file    Stress: Not on file   Relationships    Social connections     Talks on phone: Not on file     Gets together: Not on file     Attends [employer] service: Not on file     Active member of club or organization: Not on file     Attends meetings of clubs or organizations: Not on file     Relationship status: Not on file    Intimate partner violence     Fear of current or ex partner: Not on file     Emotionally abused: Not on file Physically abused: Not on file     Forced sexual activity: Not on file   Other Topics Concern    Not on file   Social History Narrative    Not on file         ALLERGIES: Ciprofloxacin; Oxycontin [oxycodone]; Sulfa (sulfonamide antibiotics); and Zocor [simvastatin]    Review of Systems   Constitutional: Negative for activity change, chills and fever. HENT: Negative for ear pain, nosebleeds, rhinorrhea, sore throat, trouble swallowing and voice change. Eyes: Negative for visual disturbance. Respiratory: Positive for shortness of breath and wheezing. Negative for cough and sputum production. Cardiovascular: Negative for chest pain, palpitations, orthopnea, claudication, leg swelling, syncope and PND. Gastrointestinal: Negative for abdominal pain, constipation, diarrhea, nausea and vomiting. Genitourinary: Negative for difficulty urinating, dysuria, hematuria and urgency. Musculoskeletal: Positive for myalgias. Negative for back pain, neck pain, neck stiffness and stiffness. Skin: Negative for color change, itching and rash. Allergic/Immunologic: Negative for immunocompromised state. Neurological: Negative for dizziness, tingling, seizures, syncope, weakness, light-headedness, numbness and headaches. Psychiatric/Behavioral: Negative for behavioral problems, confusion, hallucinations, self-injury and suicidal ideas. Vitals:    11/06/20 1456   BP: (!) 157/84   Pulse: 97   Resp: 18   Temp: (!) 96.7 °F (35.9 °C)   SpO2: 94%   Weight: 79 kg (174 lb 2.6 oz)   Height: 5' 1\" (1.549 m)            Physical Exam  Vitals signs and nursing note reviewed. Constitutional:       General: She is not in acute distress. Appearance: She is well-developed. She is not diaphoretic. HENT:      Head: Normocephalic and atraumatic. Eyes:      Pupils: Pupils are equal, round, and reactive to light. Neck:      Musculoskeletal: Normal range of motion and neck supple.    Cardiovascular:      Rate and Rhythm: Normal rate and regular rhythm. Heart sounds: Normal heart sounds. No murmur. No friction rub. No gallop. Pulmonary:      Effort: Pulmonary effort is normal. No respiratory distress. Breath sounds: Wheezing present. Abdominal:      General: Bowel sounds are normal. There is no distension. Palpations: Abdomen is soft. Tenderness: There is no abdominal tenderness. There is no guarding or rebound. Musculoskeletal: Normal range of motion. Skin:     General: Skin is warm. Findings: No rash. Neurological:      Mental Status: She is alert and oriented to person, place, and time. Psychiatric:         Behavior: Behavior normal.         Thought Content: Thought content normal.         Judgment: Judgment normal.          MDM     This is a 38-year-old female with past medical history, review of systems, physical exam as above, presenting with complaints of calf pain and shortness of breath. Patient states a history of asthma, states she uses her albuterol inhaler twice daily, uses nebulizer twice monthly, states shortness of breath became worse yesterday, states it feels \"different\" than her typical asthma exacerbation. She denies fevers or chills, chest pain, recent travel or known sick contacts. She states bilateral lower extremity cramping, and a history of factor V Leiden disease. She states previous history of DVT, following surgery, not currently taking blood thinners. Physical exam is remarkable for well-appearing elderly female, in no acute distress, noted to be mildly hypertensive, afebrile, satting 97% on room air, without tachypnea or tachycardia. She has nontender nonerythematous bilateral calves, diminished breath sounds with scattered wheezing, soft nontender abdomen. Suspect acute on chronic asthma exacerbation, however will evaluate via CMP, CBC, D-dimer, cardiac enzymes, chest x-ray and EKG.   We will provide nebulizer treatments, reassess, and make a disposition. Procedures    4:43 PM  Patient refusing nebulizer treatments despite mild wheezing on exam,  Negative Doppler, EKG, cardiac enzymes and D-dimer. Likely chronic asthma with mild exacerbation. Remains satting well on room air no acute distress. Will discharge home to continue her medications, discussed increased compliance, primary care follow-up, return precautions given.

## 2020-11-06 NOTE — DISCHARGE INSTRUCTIONS
Patient Education        Shortness of Breath: Care Instructions  Your Care Instructions     Shortness of breath has many causes. Sometimes conditions such as anxiety can lead to shortness of breath. Some people get mild shortness of breath when they exercise. Trouble breathing also can be a symptom of a serious problem, such as asthma, lung disease, emphysema, heart problems, and pneumonia. If your shortness of breath continues, you may need tests and treatment. Watch for any changes in your breathing and other symptoms. Follow-up care is a key part of your treatment and safety. Be sure to make and go to all appointments, and call your doctor if you are having problems. It's also a good idea to know your test results and keep a list of the medicines you take. How can you care for yourself at home? · Do not smoke or allow others to smoke around you. If you need help quitting, talk to your doctor about stop-smoking programs and medicines. These can increase your chances of quitting for good. · Get plenty of rest and sleep. · Take your medicines exactly as prescribed. Call your doctor if you think you are having a problem with your medicine. · Find healthy ways to deal with stress. ? Exercise daily. ? Get plenty of sleep. ? Eat regularly and well. When should you call for help? Call 911 anytime you think you may need emergency care. For example, call if:    · You have severe shortness of breath.     · You have symptoms of a heart attack. These may include:  ? Chest pain or pressure, or a strange feeling in the chest.  ? Sweating. ? Shortness of breath. ? Nausea or vomiting. ? Pain, pressure, or a strange feeling in the back, neck, jaw, or upper belly or in one or both shoulders or arms. ? Lightheadedness or sudden weakness. ? A fast or irregular heartbeat. After you call 911, the  may tell you to chew 1 adult-strength or 2 to 4 low-dose aspirin. Wait for an ambulance.  Do not try to drive yourself. Call your doctor now or seek immediate medical care if:    · Your shortness of breath gets worse or you start to wheeze. Wheezing is a high-pitched sound when you breathe.     · You wake up at night out of breath or have to prop your head up on several pillows to breathe.     · You are short of breath after only light activity or while at rest.   Watch closely for changes in your health, and be sure to contact your doctor if:    · You do not get better over the next 1 to 2 days. Where can you learn more? Go to http://www.gray.com/  Enter S780 in the search box to learn more about \"Shortness of Breath: Care Instructions. \"  Current as of: February 24, 2020               Content Version: 12.6  © 2006-2020 [email or web address] Care instructions adapted under license by [employer] (which disclaims liability or warranty for this information). If you have questions about a medical condition or this instruction, always ask your healthcare professional. [place] [address] any warranty or liability for your use of this information. Patient Education        Asthma in Adults: Care Instructions  Your Care Instructions     During an asthma attack, your airways swell and narrow as a reaction to certain things (triggers). This makes it hard to breathe. You may be able to prevent asthma attacks if you avoid the things that set off your asthma symptoms. Keeping your asthma under control and treating symptoms before they get bad can help you avoid severe attacks. If you can control your asthma, you may be able to do all of your normal daily activities. You may also avoid asthma attacks and trips to the hospital.  Follow-up care is a key part of your treatment and safety. Be sure to make and go to all appointments, and call your doctor if you are having problems.  It's also a good idea to know your test results and keep a list of the medicines you take.  How can you care for yourself at home? · Follow your asthma action plan so you can manage your symptoms at home. An asthma action plan will help you prevent and control airway reactions and will tell you what to do during an asthma attack. If you do not have an asthma action plan, work with your doctor to build one. · Take your asthma medicine exactly as prescribed. Medicine plays an important role in controlling asthma. Talk to your doctor right away if you have any questions about what to take and how to take it. ? Use your quick-relief medicine when you have symptoms of an attack. Quick-relief medicine often is an albuterol inhaler. Some people need to use quick-relief medicine before they exercise. ? Take your controller medicine every day, not just when you have symptoms. Controller medicine is usually an inhaled corticosteroid. The goal is to prevent problems before they occur. Do not use your controller medicine to try to treat an attack that has already started. It does not work fast enough to help. ? If your doctor prescribed corticosteroid pills to use during an attack, take them as directed. They may take hours to work, but they may shorten the attack and help you breathe better. ? Keep your quick-relief medicine with you at all times. · Talk to your doctor before using other medicines. Some medicines, such as aspirin, can cause asthma attacks in some people. · Check yourself for asthma symptoms to know which step to follow in your action plan. Watch for things like being short of breath, having chest tightness, coughing, and wheezing. Also notice if symptoms wake you up at night or if you get tired quickly when you exercise. · If you have a peak flow meter, use it to check how well you are breathing. This can help you predict when an asthma attack is going to occur. Then you can take medicine to prevent the asthma attack or make it less severe. · See your doctor regularly.  These visits will help you learn more about asthma and what you can do to control it. Your doctor will monitor your treatment to make sure the medicine is helping you. · Keep track of your asthma attacks and your treatment. After you have had an attack, write down what triggered it, what helped end it, and any concerns you have about your asthma action plan. Take your diary when you see your doctor. You can then review your asthma action plan and decide if it is working. · Do not smoke or allow others to smoke around you. Avoid smoky places. Smoking makes asthma worse. If you need help quitting, talk to your doctor about stop-smoking programs and medicines. These can increase your chances of quitting for good. · Learn what triggers an asthma attack for you, and avoid the triggers when you can. Common triggers include colds, smoke, air pollution, dust, pollen, mold, pets, cockroaches, stress, and cold air. · Avoid colds and the flu. Get a pneumococcal vaccine shot. If you have had one before, ask your doctor whether you need a second dose. Get a flu vaccine every fall. If you must be around people with colds or the flu, wash your hands often. When should you call for help? Call 911 anytime you think you may need emergency care. For example, call if:    · You have severe trouble breathing. Call your doctor now or seek immediate medical care if:    · Your symptoms do not get better after you have followed your asthma action plan.     · You cough up yellow, dark brown, or bloody mucus (sputum). Watch closely for changes in your health, and be sure to contact your doctor if:    · Your coughing and wheezing get worse.     · You need to use quick-relief medicine on more than 2 days a week (unless it is just for exercise).     · You need help figuring out what is triggering your asthma attacks. Where can you learn more?   Go to http://www.gray.com/  Enter P597 in the search box to learn more about \"Asthma in Adults: Care Instructions. \"  Current as of: February 24, 2020               Content Version: 12.6  © 1055-0450 [employer]. Care instructions adapted under license by [employer] (which disclaims liability or warranty for this information). If you have questions about a medical condition or this instruction, always ask your healthcare professional. [address] any warranty or liability for your use of this information.

## 2020-11-08 LAB
ATRIAL RATE: 87 BPM
CALCULATED P AXIS, ECG09: 29 DEGREES
CALCULATED R AXIS, ECG10: -25 DEGREES
CALCULATED T AXIS, ECG11: 41 DEGREES
DIAGNOSIS, 93000: NORMAL
P-R INTERVAL, ECG05: 132 MS
Q-T INTERVAL, ECG07: 346 MS
QRS DURATION, ECG06: 90 MS
QTC CALCULATION (BEZET), ECG08: 416 MS
VENTRICULAR RATE, ECG03: 87 BPM

## 2021-01-05 ENCOUNTER — TRANSCRIBE ORDER (OUTPATIENT)
Dept: SCHEDULING | Age: 66
End: 2021-01-05

## 2021-01-05 DIAGNOSIS — Z12.31 VISIT FOR SCREENING MAMMOGRAM: Primary | ICD-10-CM

## 2021-01-05 DIAGNOSIS — M85.89 OSTEOPENIA OF MULTIPLE SITES: ICD-10-CM

## 2021-02-26 ENCOUNTER — HOSPITAL ENCOUNTER (OUTPATIENT)
Dept: GENERAL RADIOLOGY | Age: 66
Discharge: HOME | End: 2021-02-26
Attending: INTERNAL MEDICINE
Payer: MEDICARE

## 2021-02-26 ENCOUNTER — TRANSCRIBE ORDER (OUTPATIENT)
Dept: MAMMOGRAPHY | Age: 66
End: 2021-02-26

## 2021-02-26 DIAGNOSIS — M25.551 RIGHT HIP PAIN: ICD-10-CM

## 2021-02-26 DIAGNOSIS — M25.551 RIGHT HIP PAIN: Primary | ICD-10-CM

## 2021-02-26 PROCEDURE — 73502 X-RAY EXAM HIP UNI 2-3 VIEWS: CPT

## 2021-04-16 ENCOUNTER — HOSPITAL ENCOUNTER (OUTPATIENT)
Dept: MAMMOGRAPHY | Age: 66
Discharge: HOME | End: 2021-04-16
Attending: INTERNAL MEDICINE
Payer: MEDICARE

## 2021-04-16 DIAGNOSIS — Z12.31 VISIT FOR SCREENING MAMMOGRAM: ICD-10-CM

## 2021-04-16 DIAGNOSIS — M85.89 OSTEOPENIA OF MULTIPLE SITES: ICD-10-CM

## 2021-04-16 PROCEDURE — 77063 BREAST TOMOSYNTHESIS BI: CPT

## 2021-04-16 PROCEDURE — 77080 DXA BONE DENSITY AXIAL: CPT

## 2021-12-15 ENCOUNTER — OFFICE VISIT (OUTPATIENT)
Dept: ORTHOPEDIC SURGERY | Age: 66
End: 2021-12-15
Payer: MEDICARE

## 2021-12-15 VITALS — HEIGHT: 61 IN | WEIGHT: 174 LBS | BODY MASS INDEX: 32.85 KG/M2

## 2021-12-15 DIAGNOSIS — M16.11 PRIMARY LOCALIZED OSTEOARTHRITIS OF RIGHT HIP: Primary | ICD-10-CM

## 2021-12-15 DIAGNOSIS — M16.11 PRIMARY LOCALIZED OSTEOARTHRITIS OF RIGHT HIP: ICD-10-CM

## 2021-12-15 DIAGNOSIS — M25.551 ACUTE RIGHT HIP PAIN: Primary | ICD-10-CM

## 2021-12-15 DIAGNOSIS — G89.29 CHRONIC RIGHT HIP PAIN: ICD-10-CM

## 2021-12-15 DIAGNOSIS — M25.551 CHRONIC RIGHT HIP PAIN: ICD-10-CM

## 2021-12-15 PROCEDURE — G8399 PT W/DXA RESULTS DOCUMENT: HCPCS | Performed by: ORTHOPAEDIC SURGERY

## 2021-12-15 PROCEDURE — G8427 DOCREV CUR MEDS BY ELIG CLIN: HCPCS | Performed by: ORTHOPAEDIC SURGERY

## 2021-12-15 PROCEDURE — 1101F PT FALLS ASSESS-DOCD LE1/YR: CPT | Performed by: ORTHOPAEDIC SURGERY

## 2021-12-15 PROCEDURE — 3017F COLORECTAL CA SCREEN DOC REV: CPT | Performed by: ORTHOPAEDIC SURGERY

## 2021-12-15 PROCEDURE — G8536 NO DOC ELDER MAL SCRN: HCPCS | Performed by: ORTHOPAEDIC SURGERY

## 2021-12-15 PROCEDURE — 1090F PRES/ABSN URINE INCON ASSESS: CPT | Performed by: ORTHOPAEDIC SURGERY

## 2021-12-15 PROCEDURE — G8417 CALC BMI ABV UP PARAM F/U: HCPCS | Performed by: ORTHOPAEDIC SURGERY

## 2021-12-15 PROCEDURE — G8432 DEP SCR NOT DOC, RNG: HCPCS | Performed by: ORTHOPAEDIC SURGERY

## 2021-12-15 PROCEDURE — 99214 OFFICE O/P EST MOD 30 MIN: CPT | Performed by: ORTHOPAEDIC SURGERY

## 2021-12-15 PROCEDURE — G9899 SCRN MAM PERF RSLTS DOC: HCPCS | Performed by: ORTHOPAEDIC SURGERY

## 2021-12-15 NOTE — PROGRESS NOTES
Phan Marino (: 1955) is a 74 y.o. female, patient, here for evaluation of the following chief complaint(s):  Hip Pain (right)       HPI:    Patient presents today to schedule her hip replacement get new x-rays. She was on again off again the schedule over the summer. Her symptoms have worsened significantly. Allergies   Allergen Reactions    Ciprofloxacin Rash    Oxycontin [Oxycodone] Shortness of Breath and Itching    Sulfa (Sulfonamide Antibiotics) Rash    Zocor [Simvastatin] Myalgia       Current Outpatient Medications   Medication Sig    carbidopa-levodopa (SINEMET)  mg per tablet Take 1 Tab by mouth four (4) times daily.  turmeric root extract 500 mg cap Take  by mouth.  rOPINIRole (REQUIP XL) 6 mg Tb24 Take  by mouth.  Nebulizer & Compressor machine 1 Each by Does Not Apply route four (4) times daily as needed.  levalbuterol (XOPENEX) 0.63 mg/3 mL nebu 3 mL by Nebulization route every six (6) hours as needed.  rasagiline (AZILECT) 1 mg tablet Take 1 Tab by mouth daily.  albuterol (PROVENTIL HFA, VENTOLIN HFA, PROAIR HFA) 90 mcg/actuation inhaler Take  by inhalation.  atorvastatin (LIPITOR) 10 mg tablet Take  by mouth daily.  oxymetazoline (AFRIN SINUS, OXYMETAZOLINE,) 0.05 % nasal spray 2 Sprays by Both Nostrils route two (2) times a day.  cycloSPORINE (RESTASIS) 0.05 % ophthalmic emulsion Administer 1 Drop to both eyes two (2) times a day.  OxyMORPhone (OPANA ER) 10 mg tablet Take 10 mg by mouth every twelve (12) hours.  calcium-cholecalciferol, d3, (CALCIUM 600 + D) 600-125 mg-unit Tab Take  by mouth.  SODIUM CHLORIDE/SODIUM BICARB (NEILMED SINUS RINSE COMPLETE NA) by Nasal route.  polyethylene glycol (MIRALAX) 17 gram packet Take 17 g by mouth daily.  rosuvastatin (CRESTOR) 5 mg tablet Take  by mouth nightly.  aspirin (ASPIRIN) 325 mg tablet Take 325 mg by mouth daily.     NAPROXEN SODIUM (ALEVE PO) Take 440 mg by mouth two (2) times a day.    ergocalciferol (VITAMIN D) 50,000 unit capsule Take 50,000 Units by mouth every seven (7) days.  lansoprazole (PREVACID SOLUTAB) 30 mg disintegrating tablet Take  by mouth daily.  hydrochlorothiazide (HYDRODIURIL) 25 mg tablet Take 25 mg by mouth daily.  budesonide (PULMICORT) 180 mcg/Inhalation AePB inhaler Take  by inhalation two (2) times a day.  lorazepam (ATIVAN) 0.5 mg tablet Take  by mouth every four (4) hours as needed. No current facility-administered medications for this visit.        Past Medical History:   Diagnosis Date    Arthritis     Asthma     Cancer ([facility] [address] 2006    MELANOMA    Chronic pain     Constipation     Embolism - blood clot 2011    Gallbladder polyp     Gastro-oesophageal reflux disease with hiatal hernia     GERD (gastroesophageal reflux disease)     Hypertension     Liver disease     ELEVATED LIVER ENZYMES 2011    Melanoma ([facility] [address]     Neurological disorder     parkison's disease    Osteoporosis     Other ill-defined conditions(799.89)     factor 5    Parkinson's disease     Seizures ([facility] [address]     FEBRILE SEIZURES AS A CHILD    Spinal stenosis of lumbar region     Steroid-induced avascular necrosis of hip ([facility] [address]         Past Surgical History:   Procedure Laterality Date    HX APPENDECTOMY  1961    HX GI      COLONSCOPY/EGD    HX ORTHOPAEDIC       left hip 7/2011    HX TONSILLECTOMY      [place] ANESTH,HIP JOINT SURGERY  07/18/2011    left hip surgery       Family History   Problem Relation Age of Onset    Dementia Mother         AD Lewy Bodies    Hypertension Father     Asthma Father     Other Father         CONFUSION/ B/P DROPS    Cancer Paternal Aunt         STOMACH CA    Alcohol abuse Maternal Grandfather     Diabetes Paternal Grandfather     Heart Disease Paternal Grandfather         MI    Stroke Paternal Grandfather     Cancer Paternal Aunt         LEUKEMIA        Social History     Socioeconomic History    Marital status:  Spouse name: Not on file    Number of children: Not on file    Years of education: Not on file    Highest education level: Not on file   Occupational History    Not on file   Tobacco Use    Smoking status: Never Smoker    Smokeless tobacco: Never Used   Substance and Sexual Activity    Alcohol use: Yes     Comment: 3-5 GLASSES WINE WEEKLY    Drug use: No    Sexual activity: Not on file   Other Topics Concern    Not on file   Social History Narrative    Not on file     Social Determinants of Health     Financial Resource Strain:     Difficulty of Paying Living Expenses: Not on file   Food Insecurity:     Worried About Running Out of Food in the Last Year: Not on file    Lindsay of Food in the Last Year: Not on file   Transportation Needs:     Lack of Transportation (Medical): Not on file    Lack of Transportation (Non-Medical): Not on file   Physical Activity:     Days of Exercise per Week: Not on file    Minutes of Exercise per Session: Not on file   Stress:     Feeling of Stress : Not on file   Social Connections:     Frequency of Communication with Friends and Family: Not on file    Frequency of Social Gatherings with Friends and Family: Not on file    Attends [employer] Services: Not on file    Active Member of [address] [place] [address] or Organizations: Not on file    Attends Club or Organization Meetings: Not on file    Marital Status: Not on file   Intimate Partner Violence:     Fear of Current or Ex-Partner: Not on file    Emotionally Abused: Not on file    Physically Abused: Not on file    Sexually Abused: Not on file   Housing Stability:     Unable to Pay for Housing in the Last Year: Not on file    Number of [place] in the Last Year: Not on file    Unstable Housing in the Last Year: Not on file             Vitals:  Ht 5' 1\" (1.549 m)   Wt 174 lb (78.9 kg)   BMI 32.88 kg/m²    Body mass index is 32.88 kg/m². PHYSICAL EXAM:  Wheelchair-bound now.   She can only walk short distances. IMAGING:  XR Results (most recent):  Results from Appointment encounter on 12/15/21    XR HIP RT W OR WO PELV 2-3 VWS    Narrative  2 x-ray views right hip AP pelvis and lateral of right hip. She has significant femoral head collapse now with large cyst in the femoral head. Significant progression since her last set of x-rays. ASSESSMENT/PLAN:  1. Acute right hip pain  -     XR HIP RT W OR WO PELV 2-3 VWS; Future  2. Primary localized osteoarthritis of right hip  3. Chronic right hip pain    We discussed continued conservative treatment measures versus total joint replacement. Symptoms have progressed despite conservative treatment measures outlined above and they desire to proceed with right total hip. Patient has had symptoms for over 12months. They have decline in their activities of daily living with inability to walk long distances. There has been progressive decline in function. Discussed risks, benefits, and alternatives in detail, as well as anticipated hospital stay and course of rehabilitation. They will see their primary care physician prior to surgery. All questions answered. An electronic signature was used to authenticate this note.   --Marie Faye MD

## 2021-12-28 ENCOUNTER — TRANSCRIBE ORDER (OUTPATIENT)
Dept: REGISTRATION | Age: 66
End: 2021-12-28

## 2021-12-28 DIAGNOSIS — Z01.812 PRE-PROCEDURE LAB EXAM: Primary | ICD-10-CM

## 2022-01-10 ENCOUNTER — HOSPITAL ENCOUNTER (OUTPATIENT)
Dept: PREADMISSION TESTING | Age: 67
Discharge: HOME | End: 2022-01-10
Payer: MEDICARE

## 2022-01-10 VITALS
DIASTOLIC BLOOD PRESSURE: 84 MMHG | TEMPERATURE: 97.3 F | HEIGHT: 61 IN | WEIGHT: 172.18 LBS | BODY MASS INDEX: 32.51 KG/M2 | HEART RATE: 96 BPM | SYSTOLIC BLOOD PRESSURE: 134 MMHG

## 2022-01-10 LAB
ABO + RH BLD: NORMAL
ANION GAP SERPL CALC-SCNC: 5 MMOL/L (ref 5–15)
APPEARANCE UR: CLEAR
ATRIAL RATE: 90 BPM
BACTERIA URNS QL MICRO: ABNORMAL /HPF
BILIRUB UR QL: NEGATIVE
BLOOD GROUP ANTIBODIES SERPL: NORMAL
BUN SERPL-MCNC: 21 MG/DL (ref 6–20)
BUN/CREAT SERPL: 18 (ref 12–20)
CALCIUM SERPL-MCNC: 9 MG/DL (ref 8.5–10.1)
CALCULATED P AXIS, ECG09: 46 DEGREES
CALCULATED R AXIS, ECG10: -23 DEGREES
CALCULATED T AXIS, ECG11: 28 DEGREES
CAOX CRY URNS QL MICRO: ABNORMAL
CHLORIDE SERPL-SCNC: 105 MMOL/L (ref 97–108)
CO2 SERPL-SCNC: 30 MMOL/L (ref 21–32)
COLOR UR: ABNORMAL
CREAT SERPL-MCNC: 1.14 MG/DL (ref 0.55–1.02)
DIAGNOSIS, 93000: NORMAL
EPITH CASTS URNS QL MICRO: ABNORMAL /LPF
ERYTHROCYTE [DISTWIDTH] IN BLOOD BY AUTOMATED COUNT: 12.3 % (ref 11.5–14.5)
EST. AVERAGE GLUCOSE BLD GHB EST-MCNC: 108 MG/DL
GLUCOSE SERPL-MCNC: 133 MG/DL (ref 65–100)
GLUCOSE UR STRIP.AUTO-MCNC: NEGATIVE MG/DL
HBA1C MFR BLD: 5.4 % (ref 4–5.6)
HCT VFR BLD AUTO: 39.6 % (ref 35–47)
HGB BLD-MCNC: 12.7 G/DL (ref 11.5–16)
HGB UR QL STRIP: NEGATIVE
HYALINE CASTS URNS QL MICRO: ABNORMAL /LPF (ref 0–5)
INR PPP: 1.1 (ref 0.9–1.1)
KETONES UR QL STRIP.AUTO: 15 MG/DL
LEUKOCYTE ESTERASE UR QL STRIP.AUTO: ABNORMAL
MCH RBC QN AUTO: 30.3 PG (ref 26–34)
MCHC RBC AUTO-ENTMCNC: 32.1 G/DL (ref 30–36.5)
MCV RBC AUTO: 94.5 FL (ref 80–99)
MUCOUS THREADS URNS QL MICRO: ABNORMAL /LPF
NITRITE UR QL STRIP.AUTO: NEGATIVE
NRBC # BLD: 0 K/UL (ref 0–0.01)
NRBC BLD-RTO: 0 PER 100 WBC
P-R INTERVAL, ECG05: 154 MS
PH UR STRIP: 5.5 [PH] (ref 5–8)
PLATELET # BLD AUTO: 215 K/UL (ref 150–400)
PMV BLD AUTO: 11.6 FL (ref 8.9–12.9)
POTASSIUM SERPL-SCNC: 3.5 MMOL/L (ref 3.5–5.1)
PROT UR STRIP-MCNC: ABNORMAL MG/DL
PROTHROMBIN TIME: 11.1 SEC (ref 9–11.1)
Q-T INTERVAL, ECG07: 370 MS
QRS DURATION, ECG06: 94 MS
QTC CALCULATION (BEZET), ECG08: 452 MS
RBC # BLD AUTO: 4.19 M/UL (ref 3.8–5.2)
RBC #/AREA URNS HPF: ABNORMAL /HPF (ref 0–5)
SODIUM SERPL-SCNC: 140 MMOL/L (ref 136–145)
SPECIMEN EXP DATE BLD: NORMAL
UA: UC IF INDICATED,UAUC: ABNORMAL
UROBILINOGEN UR QL STRIP.AUTO: 1 EU/DL (ref 0.2–1)
VENTRICULAR RATE, ECG03: 90 BPM
WBC # BLD AUTO: 5.6 K/UL (ref 3.6–11)
WBC URNS QL MICRO: ABNORMAL /HPF (ref 0–4)

## 2022-01-10 PROCEDURE — 81001 URINALYSIS AUTO W/SCOPE: CPT

## 2022-01-10 PROCEDURE — 86900 BLOOD TYPING SEROLOGIC ABO: CPT

## 2022-01-10 PROCEDURE — 85610 PROTHROMBIN TIME: CPT

## 2022-01-10 PROCEDURE — 80048 BASIC METABOLIC PNL TOTAL CA: CPT

## 2022-01-10 PROCEDURE — 93005 ELECTROCARDIOGRAM TRACING: CPT

## 2022-01-10 PROCEDURE — 85027 COMPLETE CBC AUTOMATED: CPT

## 2022-01-10 PROCEDURE — 83036 HEMOGLOBIN GLYCOSYLATED A1C: CPT

## 2022-01-10 RX ORDER — HYDROMORPHONE HYDROCHLORIDE 4 MG/1
4 TABLET ORAL
COMMUNITY
End: 2022-01-20

## 2022-01-10 RX ORDER — POLYETHYLENE GLYCOL 400 2.5 MG/ML
SOLUTION/ DROPS OPHTHALMIC AS NEEDED
COMMUNITY

## 2022-01-10 RX ORDER — MENTHOL 8 %
SPRAY, NON-AEROSOL (ML) TOPICAL AS NEEDED
COMMUNITY

## 2022-01-10 RX ORDER — MONTELUKAST SODIUM 10 MG/1
10 TABLET ORAL EVERY EVENING
COMMUNITY

## 2022-01-10 RX ORDER — AMANTADINE HYDROCHLORIDE 100 MG/1
100 CAPSULE, GELATIN COATED ORAL EVERY EVENING
COMMUNITY

## 2022-01-10 RX ORDER — MECLIZINE HYDROCHLORIDE 25 MG/1
25 TABLET ORAL
COMMUNITY

## 2022-01-10 RX ORDER — GABAPENTIN 100 MG/1
200 CAPSULE ORAL 4 TIMES DAILY
COMMUNITY

## 2022-01-10 NOTE — PERIOP NOTES
PATIENT GIVEN SURGICAL SITE INFECTION FAQ HANDOUT AND HAND WASHING TIP SHEET. PREOP INSTRUCTIONS REVIEWED AND PATIENT VERBALIZES UNDERSTANDING OF INSTRUCTIONS. PATIENT HAS BEEN GIVEN THE OPPORTUNITY TO ASK ADDITIONAL QUESTIONS.

## 2022-01-10 NOTE — PERIOP NOTES
[address] [facility] INSTRUCTIONS  ORTHOPAEDIC    Surgery Date:   1/18/22    [facility] staff will call you between 4 PM- 8 PM the day before surgery with your arrival time. If your surgery is on a Monday, we will call you the preceding Friday. Please call [phone] after 8 PM if you did not receive your arrival time. 1. Please report to [address] Patient Access/Admitting on the 1st floor. Bring your insurance card, photo identification, and any copayment ( if applicable). 2. If you are going home the same day of your surgery, you must have a responsible adult to drive you home. You need to have a responsible adult to stay with you the first 24 hours after surgery and you should not drive a car for 24 hours following your surgery. 3. Do NOT eat any solid foods after midnight the night before surgery including candy, mints or gum. You may drink clear liquids from midnight until 1 hour prior to arrival time. You may drink up to 12 ounces at one time every 4 hours. 4. Do NOT drink alcohol or smoke 24 hours before surgery. STOP smoking for 14 days prior as it helps with breathing and healing after surgery. 5. If your arrival time is 3pm or later, you may eat a light breakfast before 8am (toast, bagel-no butter, black coffee, plain tea, fruit juice-no pulp) Please note special instructions, if applicable, below for medications. 6. If you are being admitted to the hospital,please leave personal belongings/luggage in your car until you have an assigned hospital room number. 7. Please wear comfortable clothes. Wear your glasses instead of contacts. We ask that all money, jewelry and valuables be left at home. Wear no make up, particularly mascara, the day of surgery. 8.  All body piercings, rings, and jewelry need to be removed and left at home. Please remove any nail polish or artifical nails from your fingernails. Please wear your hair loose or down.  Please no pony-tails, buns, or any metal hair accessories. If you shower the morning of surgery, please do not apply any lotions or powders afterwards. You may wear deodorant. Do not shave any body area within 24 hours of your surgery. 9. Please follow all instructions to avoid any potential surgical cancellation. 10. Should your physical condition change, (i.e. fever, cold, flu, etc.) please notify your surgeon as soon as possible. 11. It is important to be on time. If a situation occurs where you may be delayed, please call:  [phone] / [phone] on the day of surgery. 12. The Preadmission Testing staff can be reached at [phone]. 13. Special instructions: na    Current Outpatient Medications   Medication Sig    aspirin 500 mg tablet Take 1,000 mg by mouth every other day. AS NEEDED    CALCIUM PO Take 50,000 Units by mouth every seven (7) days. THURSDAY    gabapentin (NEURONTIN) 100 mg capsule Take 200 mg by mouth four (4) times daily.  montelukast (SINGULAIR) 10 mg tablet Take 10 mg by mouth every evening.  amantadine HCL (SYMMETREL) 100 mg capsule Take 100 mg by mouth every evening.  HYDROmorphone (DILAUDID) 4 mg tablet Take 4 mg by mouth every four (4) hours as needed for Pain. TAKES 5X DAY ROUTINELY    meclizine (ANTIVERT) 25 mg tablet Take 25 mg by mouth three (3) times daily as needed for Dizziness.  OTHER as needed. CANNABIS HEMP HOT CREAM    polyethylene glycol 400 (Blink Gel Tears) 0.25 % drpg Apply  to eye as needed.  Menthol (Stopain) 8 % spry by Apply Externally route as needed. ROLL ON    carbidopa-levodopa (SINEMET)  mg per tablet Take 1 Tab by mouth four (4) times daily.  Nebulizer & Compressor machine 1 Each by Does Not Apply route four (4) times daily as needed.  levalbuterol (XOPENEX) 0.63 mg/3 mL nebu 3 mL by Nebulization route every six (6) hours as needed.  atorvastatin (LIPITOR) 10 mg tablet Take  by mouth every evening.     polyethylene glycol (MIRALAX) 17 gram packet Take 17 g by mouth as needed.  aspirin (ASPIRIN) 325 mg tablet Take 500 mg by mouth every other day. PRN    ergocalciferol (VITAMIN D) 50,000 unit capsule Take 50,000 Units by mouth every seven (7) days.  lansoprazole (PREVACID SOLUTAB) 30 mg disintegrating tablet Take  by mouth Every morning.  hydrochlorothiazide (HYDRODIURIL) 25 mg tablet Take 25 mg by mouth every evening.  budesonide (PULMICORT) 180 mcg/Inhalation AePB inhaler Take 2 Puffs by inhalation two (2) times a day.  turmeric root extract 500 mg cap Take  by mouth.  rOPINIRole (REQUIP XL) 6 mg Tb24 Take  by mouth every evening.  rasagiline (AZILECT) 1 mg tablet Take 1 Tab by mouth daily. (Patient taking differently: Take 1 mg by mouth every evening.)    SODIUM CHLORIDE/SODIUM BICARB (NEILMED SINUS RINSE COMPLETE NA) by Nasal route. No current facility-administered medications for this encounter. 1. YOU MUST ONLY TAKE THESE MEDICATIONS THE MORNING OF SURGERY WITH A SIP OF WATER: GAPAPENTIN, MECLIZINE, SINEMET, PREVACID, PULMICORT  2. MEDICATIONS TO TAKE THE MORNING OF SURGERY ONLY IF NEEDED: HYDROMORPHONE (LAST DOSE 4 HRS PRIOR TO ARRIVAL AT HOSPITAL), XOPENEX  3. HOLD these medications BEFORE Surgery: ASPIRIN, CANNABIS HEMP CREAM, STOPAIN, VITAMIN D, AND TURMERIC MUST ALL BE STOPPED 7 DAYS PRIOR TO SURGERY  4. Ask your surgeon/prescribing physician about when/if to STOP taking these medications: NONE  5. Stop any non-steroidal anti-inflammatory drugs (i.e. Ibuprofen, Naproxen, Advil, Aleve) 7 days before surgery. You may take Tylenol. STOP all vitamins and herbal supplements 1 week prior to  surgery. 6. If you are currently taking Plavix, Coumadin, or any other blood-thinning/anticoagulant medication contact your prescribing physician for instructions. Preventing Infections Before and After - Your Surgery    IMPORTANT INSTRUCTIONS    Please read and follow these instructions carefully.  If you are unable to comply with the below instructions your procedure will be cancelled. You play an important role in your health and preparation for surgery. To reduce the germs on your skin you will need to shower with CHG soap (Chorhexidine gluconate 4%) two times before surgery. CHG soap (Hibiclens, Hex-A-Clens or store brand)   CHG soap will be provided at your Preadmission Testing (PAT) appointment.  If you do not have a PAT appointment before surgery, you may arrange to  CHG soap from our office or purchase CHG soap at a pharmacy, grocery or department store.  You need to purchase TWO 4 ounce bottles to use for your 2 showers. Steps to follow:  1. Wash your hair with your normal shampoo and your body with regular soap and rinse well to remove shampoo and soap from your skin. 2. Wet a clean washcloth and turn off the shower. 3. Put CHG soap on washcloth and apply to your entire body from the neck down. Do not use on your head, face or private parts(genitals). Do not use CHG soap on open sores, wounds or areas of skin irritation. 4. Wash you body gently for 5 minutes. Do not wash your skin too hard. This soap does not create lather. Pay special attention to your underarms and from your belly button to your feet. 5. Turn the shower back on and rinse well to get CHG soap off your body. 6. Pat your skin dry with a clean, dry towel. Do not apply lotions or moisturizer. 7. Put on clean clothes and sleep on fresh bed sheets and do not allow pets to sleep with you. Shower with CHG soap 2 times before your surgery   The evening before your surgery   The morning of your surgery      Tips to help prevent infections after your surgery:  1. Protect your surgical wound from germs:  ? Hand washing is the most important thing you and your caregivers can do to prevent infections. ? Keep your bandage clean and dry! ? Do not touch your surgical wound.   2. Use clean, freshly washed towels and washcloths every time you shower; do not share bath linens with others. 3. Until your surgical wound is healed, wear clothing and sleep on bed linens each day that are clean and freshly washed. 4. Do not allow pets to sleep in your bed with you or touch your surgical wound. 5. Do not smoke - smoking delays wound healing. This may be a good time to stop smoking. 6. If you have diabetes, it is important for you to manage your blood sugar levels properly before your surgery as well as after your surgery. Poorly managed blood sugar levels slow down wound healing and prevent you from healing completely. Prevention of Infection  Testing for Staphylococcus aureus on your skin before surgery    Staphylococcus aureus (staph) is a common bacteria that is found on the body. It normally does not cause infection on healthy skin. Before surgery, you will be tested to see if you have staph by swabbing the inside of your nose. When you have an incision with surgery, the goal is to protect that incision from infection. Removal of the staph bacteria before surgery can decrease the risk of a surgical site infection. If your nose swab is positive for staph you will be called. Your treatment will include 2 steps:   Prescription for Mupirocin ointment to be used in each nostril twice a day for 5 days.  Showering with Chlorhexidine (CHG) liquid soap for 5 days prior to surgery. How to use Mupirocin ointment in your nose  1.  the prescription from your pharmacy. You will receive a large tube of ointment which will be big enough for all of your treatments. You will apply this ointment to each nostril 2 times a day for 5 days. 2. Wash your hands with  gel or soap and water for 20 seconds before using ointment. 3. Place a pea-sized amount of ointment on a cotton Q-tip. 4. Apply ointment just inside of each nostril with the Q-tip. Do not push Q-tip or ointment deep inside you nose.   5. Press your nostrils together and massage for a few seconds. 6. Wash your hands with  gel or soap and water after you are finished. 7. Do not get ointment near your eyes. If it gets into your eyes, rinse them with cool water. 8. If you need to use nasal spray, clean the tip of the bottle with alcohol before use and do not use both at the same time. 9. If you are scheduled for COVID testing during the 5 days, do NOT apply morning dose until after the COVID test has been performed. How to use Chlorhexidine (CHG) 4% liquid soap  1. Purchase an 8 ounce bottle of CHG liquid soap (Chlorhexidine 4%, Hibiclens, Hex-A-Clens or store brand) at a pharmacy or grocery store. 2. Wash your hair with your normal shampoo and your body with regular soap and rinse well to remove shampoo and soap from your skin. 3. Wet a clean washcloth and turn off the shower. 4. Put CHG soap on washcloth and apply to your entire body from the neck down. Do not use on your head, face or private parts(genitals). Do not use CHG soap on open sores, wounds or areas of skin irritation. 5. Wash your body gently for 5 minutes. Do not wash your skin too hard. This soap does not create lather. Pay special attention to your underarms and from your belly button to your feet. 6. Turn the shower back on and rinse well to get CHG soap off your body. 7. Pat your skin dry with a clean, dry towel. Do not apply lotions or moisturizer. 8. Put on clean clothes and sleep on fresh bed sheets the night before surgery. Do not allow pets to sleep with you. Eating and Drinking Before Surgery     You may eat a regular dinner at the usual time on the day before your surgery.  Do NOT eat any solid foods after midnight unless your arrival time at the hospital is 3pm or later.  You may drink clear liquids only from 12 midnight until 1 hours prior to your arrival time at the hospital on the day of your surgery. Do NOT drink alcohol.    Clear liquids include:  o Water  o Fruit juices without pulp( i.e. apple juice)  o Carbonated beverages  o Black coffee (no cream/milk)  o Tea (no cream/milk)  o Gatorade   You may drink up to 12-16 ounces at one time every 4 hours between the hours of midnight and 1 hour before your arrival time at the hospital. Example- if your arrival time at the hospital is 6am, you may drink 12-16 ounces of clear liquids no later than 5am.   If your arrival time at the hospital is 3pm or later, you may eat a light breakfast before 8am.   A light breakfast includes:  o Toast or bagel (no butter)  o Black coffee (no cream/milk)  o Tea (no cream/milk)  o Fruit juices without pulp ( i.e. apple juice)  o Do NOT eat meat, eggs, vegetables or fruit   If you have any questions, please contact your surgeon's office. [address]   Instructions for Pre-Surgery COVID-19 Testing     Across our ministry we have established standard guidelines to ensure the health and safety of our patients, residents and associates as we resume elective services for patients. All patients presenting for surgery are required to have a COVID-19 test result within 96 hours of their scheduled surgery. [facility] is providing this test free of charge to the patient.    Instructions for COVID-19 Testing:     Patients will receive a call from Pre-Admission Testing 4-5 days prior to surgery to schedule a date and time to come to the [address] [place] [address] Drive for their COVID-19 test   Patients are advised to self-quarantine after testing until their scheduled surgery   Once on site, patients will be registered and receive COVID test in their vehicle   If a patient is scheduled for normal Pre Admission Testing 96 hours from date of surgery, the patient will still have their COVID test done at the [address] [place] [address] located at [address] [place] [address] Avenue Positive results will be shared with the surgeon and anesthesiologist and may result in cancellation of the elective procedure    Testing Hours and Location:   Address:  [address] Up Pre Admission [address] in the Discharge Lot on [address] (Map Attached)  [address] [facility], [address]   Hours: Monday- Friday 7a-3p    PAT Phone Number: [phone]            Patient Information Regarding COVID Restrictions    Patients are advised to self-quarantine after COVID testing up to the day of the scheduled procedure. Day of Procedure     Please park in the parking deck or any designated visitor parking lot.  Enter the facility through the Main Entrance of the hospital.   A temperature check and appropriate symptom/exposure screening will be done prior to entry to the facility.  On the day of surgery, please provide the cell phone number of the person who will be waiting for you to the Patient Access representative at the time of registration.  Please wear a mask on the day of your procedure.  We are now allowing one designated visitor per stay. Pediatric patients may have 2 designated visitors. This one person may come in with you on the day of your procedure.  No visitors under the age of 13.  The designated visitor must also wear a mask.  Once your procedure and the immediate recovery period is completed, a nurse in the recovery area will contact your designated visitor to inform them of your room number or to otherwise review other pertinent information regarding your care.  Social distancing practices are to be adhered to in waiting areas and the cafeteria. The patient was contacted  in person. She verbalized understanding of all instructions does  need reinforcement.

## 2022-01-11 LAB
BACTERIA SPEC CULT: NORMAL
BACTERIA SPEC CULT: NORMAL
SERVICE CMNT-IMP: NORMAL

## 2022-01-11 NOTE — PERIOP NOTES
PAT Nurse Practitioner   Pre-Operative Chart Review/Assessment:-ORTHOPEDIC                Patient Name:  Andrez Godinez                                                           Age:   74 y.o.    :  1955     Today's Date:  2022     Date of PAT:   1/10/2022      Date of Surgery:    2022      Procedure(s):  Right Total Hip Arthroplasty     Surgeon:   Dr. Daniele Lockhart                       PLAN:      1)  Medical Clearance:  Dr. Mariposa Fox      2)  Cardiac Clearance:  EKG and METs reviewed. No further cardiac evaluation requested. 3)  Diabetic Treatment Consult:  Not indicated. A1c-5.4      4)  Sleep Apnea evaluation:   Not indicated.  THAIS Score 2.       5) Treatment for MRSA/Staph Aureus:  Neg      6) Additional Concerns:  HTN, GERD, Factor V Leiden w/ hx of DVT, asthma, Parkinson's                Vital Signs:         Vitals:    01/10/22 1616   BP: 134/84   Pulse: 96   Temp: 97.3 °F (36.3 °C)   Weight: 78.1 kg (172 lb 2.9 oz)   Height: 5' 1\" (1.549 m)            ____________________________________________  PAST MEDICAL HISTORY  Past Medical History:   Diagnosis Date    Arthritis     Asthma     Cancer ([facility]     MELANOMA    Chronic pain     BACK    Constipation     Embolism - blood clot     DVT    Factor 5 Leiden mutation, heterozygous ([address]     Gallbladder polyp     Gastro-oesophageal reflux disease with hiatal hernia     GERD (gastroesophageal reflux disease)     Hypertension     Liver disease     ELEVATED LIVER ENZYMES     Melanoma ([facility]     Osteoporosis     Parkinson's disease     Seizures ([facility]     FEBRILE SEIZURES AS A CHILD    Spinal stenosis of lumbar region     Steroid-induced avascular necrosis of hip ([facility] [facility]       ____________________________________________  PAST SURGICAL HISTORY  Past Surgical History:   Procedure Laterality Date    HX APPENDECTOMY      HX GI      COLONSCOPY/EGD    HX HIP REPLACEMENT Left 2011    HX TONSILLECTOMY ____________________________________________  HOME MEDICATIONS  Current Outpatient Medications   Medication Sig    aspirin 500 mg tablet Take 1,000 mg by mouth every other day. AS NEEDED    CALCIUM PO Take 50,000 Units by mouth every seven (7) days. THURSDAY    gabapentin (NEURONTIN) 100 mg capsule Take 200 mg by mouth four (4) times daily.  montelukast (SINGULAIR) 10 mg tablet Take 10 mg by mouth every evening.  amantadine HCL (SYMMETREL) 100 mg capsule Take 100 mg by mouth every evening.  HYDROmorphone (DILAUDID) 4 mg tablet Take 4 mg by mouth every four (4) hours as needed for Pain. TAKES 5X DAY ROUTINELY    meclizine (ANTIVERT) 25 mg tablet Take 25 mg by mouth three (3) times daily as needed for Dizziness.  OTHER as needed. CANNABIS HEMP HOT CREAM    polyethylene glycol 400 (Blink Gel Tears) 0.25 % drpg Apply  to eye as needed.  Menthol (Stopain) 8 % spry by Apply Externally route as needed. ROLL ON    carbidopa-levodopa (SINEMET)  mg per tablet Take 1 Tab by mouth four (4) times daily.  Nebulizer & Compressor machine 1 Each by Does Not Apply route four (4) times daily as needed.  levalbuterol (XOPENEX) 0.63 mg/3 mL nebu 3 mL by Nebulization route every six (6) hours as needed.  atorvastatin (LIPITOR) 10 mg tablet Take  by mouth every evening.  polyethylene glycol (MIRALAX) 17 gram packet Take 17 g by mouth as needed.  aspirin (ASPIRIN) 325 mg tablet Take 500 mg by mouth every other day. PRN    ergocalciferol (VITAMIN D) 50,000 unit capsule Take 50,000 Units by mouth every seven (7) days.  lansoprazole (PREVACID SOLUTAB) 30 mg disintegrating tablet Take  by mouth Every morning.  hydrochlorothiazide (HYDRODIURIL) 25 mg tablet Take 25 mg by mouth every evening.  budesonide (PULMICORT) 180 mcg/Inhalation AePB inhaler Take 2 Puffs by inhalation two (2) times a day.  turmeric root extract 500 mg cap Take  by mouth.     rOPINIRole (REQUIP XL) 6 mg Tb24 Take  by mouth every evening.  rasagiline (AZILECT) 1 mg tablet Take 1 Tab by mouth daily. (Patient taking differently: Take 1 mg by mouth every evening.)    SODIUM CHLORIDE/SODIUM BICARB (NEILMED SINUS RINSE COMPLETE NA) by Nasal route. No current facility-administered medications for this encounter.      ____________________________________________  ALLERGIES  Allergies   Allergen Reactions    Ciprofloxacin Rash    Oxycontin [Oxycodone] Shortness of Breath and Itching    Sulfa (Sulfonamide Antibiotics) Rash    Zocor [Simvastatin] Myalgia      ____________________________________________  SOCIAL HISTORY  Social History     Tobacco Use    Smoking status: Never Smoker    Smokeless tobacco: Never Used   Substance Use Topics    Alcohol use: Yes     Comment: 7 GLASSES/WEEKLY      ____________________________________________   Internal Administration   First Dose      Second Dose         Last COVID Lab SARS-CoV-2 ( )   Date Value   01/13/2022 Not detected        Labs:     Hospital Outpatient Visit on 01/10/2022   Component Date Value Ref Range Status    Sodium 01/10/2022 140  136 - 145 mmol/L Final    Potassium 01/10/2022 3.5  3.5 - 5.1 mmol/L Final    Chloride 01/10/2022 105  97 - 108 mmol/L Final    CO2 01/10/2022 30  21 - 32 mmol/L Final    Anion gap 01/10/2022 5  5 - 15 mmol/L Final    Glucose 01/10/2022 133* 65 - 100 mg/dL Final    BUN 01/10/2022 21* 6 - 20 MG/DL Final    Creatinine 01/10/2022 1.14* 0.55 - 1.02 MG/DL Final    BUN/Creatinine ratio 01/10/2022 18  12 - 20   Final    GFR est AA 01/10/2022 57* >60 ml/min/1.73m2 Final    GFR est non-AA 01/10/2022 47* >60 ml/min/1.73m2 Final    Estimated GFR is calculated using the IDMS-traceable Modification of Diet in Renal Disease (MDRD) Study equation, reported for both  Americans (GFRAA) and non- Americans (GFRNA), and normalized to 1.73m2 body surface area. The physician must decide which value applies to the patient.     Calcium 01/10/2022 9.0  8.5 - 10.1 MG/DL Final    WBC 01/10/2022 5.6  3.6 - 11.0 K/uL Final    RBC 01/10/2022 4.19  3.80 - 5.20 M/uL Final    HGB 01/10/2022 12.7  11.5 - 16.0 g/dL Final    HCT 01/10/2022 39.6  35.0 - 47.0 % Final    MCV 01/10/2022 94.5  80.0 - 99.0 FL Final    MCH 01/10/2022 30.3  26.0 - 34.0 PG Final    MCHC 01/10/2022 32.1  30.0 - 36.5 g/dL Final    RDW 01/10/2022 12.3  11.5 - 14.5 % Final    PLATELET 41/56/2883 608  150 - 400 K/uL Final    MPV 01/10/2022 11.6  8.9 - 12.9 FL Final    NRBC 01/10/2022 0.0  0  WBC Final    ABSOLUTE NRBC 01/10/2022 0.00  0.00 - 0.01 K/uL Final    Crossmatch Expiration 01/10/2022 01/13/2022,2359   Final    ABO/Rh(D) 01/10/2022 O NEGATIVE   Final    Antibody screen 01/10/2022 NEG   Final    INR 01/10/2022 1.1  0.9 - 1.1   Final    A single therapeutic range for Vit K antagonists may not be optimal for all indications - see June, 2008 issue of Chest, American College of Chest Physicians Evidence-Based Clinical Practice Guidelines, 8th Edition.  Prothrombin time 01/10/2022 11.1  9.0 - 11.1 sec Final    Color 01/10/2022 DARK YELLOW    Final    Color Reference Range: Straw, Yellow or Dark Yellow    Appearance 01/10/2022 CLEAR  CLEAR   Final    pH (UA) 01/10/2022 5.5  5.0 - 8.0   Final    Protein 01/10/2022 TRACE* NEG mg/dL Final    Glucose 01/10/2022 Negative  NEG mg/dL Final    Ketone 01/10/2022 15* NEG mg/dL Final    Bilirubin 01/10/2022 Negative  NEG   Final    Blood 01/10/2022 Negative  NEG   Final    Urobilinogen 01/10/2022 1.0  0.2 - 1.0 EU/dL Final    Nitrites 01/10/2022 Negative  NEG   Final    Leukocyte Esterase 01/10/2022 SMALL* NEG   Final    WBC 01/10/2022 0-4  0 - 4 /hpf Final    RBC 01/10/2022 0-5  0 - 5 /hpf Final    Epithelial cells 01/10/2022 FEW  FEW /lpf Final    Epithelial cell category consists of squamous cells and /or transitional urothelial cells. Renal tubular cells, if present, are separately identified as such.     Bacteria 01/10/2022 1+* NEG /hpf Final    UA:UC IF INDICATED 01/10/2022 CULTURE NOT INDICATED BY UA RESULT  CNI   Final    Mucus 01/10/2022 1+* NEG /lpf Final    CA Oxalate crystals 01/10/2022 1+* NEG Final    Hyaline cast 01/10/2022 0-2  0 - 5 /lpf Final    Ventricular Rate 01/10/2022 90  BPM Final    Atrial Rate 01/10/2022 90  BPM Final    P-R Interval 01/10/2022 154  ms Final    QRS Duration 01/10/2022 94  ms Final    Q-T Interval 01/10/2022 370  ms Final    QTC Calculation (Bezet) 01/10/2022 452  ms Final    Calculated P Axis 01/10/2022 46  degrees Final    Calculated R Axis 01/10/2022 -23  degrees Final    Calculated T Axis 01/10/2022 28  degrees Final    Diagnosis 01/10/2022    Final                    Value:Normal sinus rhythm  Anterolateral infarct , age undetermined  Abnormal ECG  No previous ECGs available  Confirmed by Cheri Tovar MD [id number]) on 1/10/2022 4:52:40 PM      Hemoglobin A1c 01/10/2022 5.4  4.0 - 5.6 % Final    Comment: NEW METHOD  PLEASE NOTE NEW REFERENCE RANGE  (NOTE)  HbA1C Interpretive Ranges  <5.7              Normal  5.7 - 6.4         Consider Prediabetes  >6.5              Consider Diabetes      Est. average glucose 01/10/2022 108  mg/dL Final    Special Requests: 01/10/2022 NO SPECIAL REQUESTS    Final    Culture result: 01/10/2022 MRSA NOT PRESENT    Final       Skin:     Denies open wounds, cuts, sores, rashes or other areas of concern in PAT assessment.           Deidra Hutton, NP

## 2022-01-12 ENCOUNTER — TRANSCRIBE ORDER (OUTPATIENT)
Dept: SCHEDULING | Age: 67
End: 2022-01-12

## 2022-01-12 DIAGNOSIS — Z12.31 ENCOUNTER FOR SCREENING MAMMOGRAM FOR MALIGNANT NEOPLASM OF BREAST: Primary | ICD-10-CM

## 2022-01-12 NOTE — PERIOP NOTES
CALLED DR. AUGUSTIN-HEMATOLOGY OFFICE FOR LOV NOTES FAX NUMBER PROVIDED, SPOKE WITH MEGAN, NOTES RECEIVED AND ON CHART

## 2022-01-13 ENCOUNTER — HOSPITAL ENCOUNTER (OUTPATIENT)
Dept: PREADMISSION TESTING | Age: 67
Discharge: HOME | End: 2022-01-13
Attending: ORTHOPAEDIC SURGERY
Payer: MEDICARE

## 2022-01-13 DIAGNOSIS — Z01.812 PRE-PROCEDURE LAB EXAM: ICD-10-CM

## 2022-01-13 PROCEDURE — U0005 INFEC AGEN DETEC AMPLI PROBE: HCPCS

## 2022-01-14 LAB
SARS-COV-2, XPLCVT: NOT DETECTED
SOURCE, COVRS: NORMAL

## 2022-01-18 ENCOUNTER — ANESTHESIA EVENT (OUTPATIENT)
Dept: SURGERY | Age: 67
End: 2022-01-18
Payer: MEDICARE

## 2022-01-18 ENCOUNTER — ANESTHESIA (OUTPATIENT)
Dept: SURGERY | Age: 67
End: 2022-01-18
Payer: MEDICARE

## 2022-01-18 ENCOUNTER — HOSPITAL ENCOUNTER (OUTPATIENT)
Age: 67
Discharge: HOME HEALTH | End: 2022-01-20
Attending: ORTHOPAEDIC SURGERY | Admitting: ORTHOPAEDIC SURGERY
Payer: MEDICARE

## 2022-01-18 ENCOUNTER — APPOINTMENT (OUTPATIENT)
Dept: GENERAL RADIOLOGY | Age: 67
End: 2022-01-18
Attending: ORTHOPAEDIC SURGERY
Payer: MEDICARE

## 2022-01-18 DIAGNOSIS — M16.11 PRIMARY OSTEOARTHRITIS OF RIGHT HIP: ICD-10-CM

## 2022-01-18 LAB
ABO + RH BLD: NORMAL
BLOOD GROUP ANTIBODIES SERPL: NORMAL
SPECIMEN EXP DATE BLD: NORMAL

## 2022-01-18 PROCEDURE — 74011250636 HC RX REV CODE- 250/636: Performed by: ANESTHESIOLOGY

## 2022-01-18 PROCEDURE — 76210000006 HC OR PH I REC 0.5 TO 1 HR: Performed by: ORTHOPAEDIC SURGERY

## 2022-01-18 PROCEDURE — 86900 BLOOD TYPING SEROLOGIC ABO: CPT

## 2022-01-18 PROCEDURE — 74011000250 HC RX REV CODE- 250: Performed by: PHYSICIAN ASSISTANT

## 2022-01-18 PROCEDURE — 36415 COLL VENOUS BLD VENIPUNCTURE: CPT

## 2022-01-18 PROCEDURE — 97530 THERAPEUTIC ACTIVITIES: CPT

## 2022-01-18 PROCEDURE — 74011000258 HC RX REV CODE- 258: Performed by: ORTHOPAEDIC SURGERY

## 2022-01-18 PROCEDURE — 74011000250 HC RX REV CODE- 250: Performed by: ORTHOPAEDIC SURGERY

## 2022-01-18 PROCEDURE — 77030026438 HC STYL ET INTUB CARD -A: Performed by: NURSE ANESTHETIST, CERTIFIED REGISTERED

## 2022-01-18 PROCEDURE — 74011000250 HC RX REV CODE- 250: Performed by: NURSE ANESTHETIST, CERTIFIED REGISTERED

## 2022-01-18 PROCEDURE — 27130 TOTAL HIP ARTHROPLASTY: CPT | Performed by: PHYSICIAN ASSISTANT

## 2022-01-18 PROCEDURE — 77030002933 HC SUT MCRYL J&J -A: Performed by: ORTHOPAEDIC SURGERY

## 2022-01-18 PROCEDURE — 2709999900 HC NON-CHARGEABLE SUPPLY: Performed by: ORTHOPAEDIC SURGERY

## 2022-01-18 PROCEDURE — 74011250636 HC RX REV CODE- 250/636: Performed by: NURSE ANESTHETIST, CERTIFIED REGISTERED

## 2022-01-18 PROCEDURE — 77030031139 HC SUT VCRL2 J&J -A: Performed by: ORTHOPAEDIC SURGERY

## 2022-01-18 PROCEDURE — 74011250636 HC RX REV CODE- 250/636: Performed by: ORTHOPAEDIC SURGERY

## 2022-01-18 PROCEDURE — 97116 GAIT TRAINING THERAPY: CPT

## 2022-01-18 PROCEDURE — 94664 DEMO&/EVAL PT USE INHALER: CPT

## 2022-01-18 PROCEDURE — 77030040922 HC BLNKT HYPOTHRM STRY -A

## 2022-01-18 PROCEDURE — 77030040361 HC SLV COMPR DVT MDII -B

## 2022-01-18 PROCEDURE — 73501 X-RAY EXAM HIP UNI 1 VIEW: CPT

## 2022-01-18 PROCEDURE — C1776 JOINT DEVICE (IMPLANTABLE): HCPCS | Performed by: ORTHOPAEDIC SURGERY

## 2022-01-18 PROCEDURE — 77030006822 HC BLD SAW SAG BRSM -B: Performed by: ORTHOPAEDIC SURGERY

## 2022-01-18 PROCEDURE — 74011250636 HC RX REV CODE- 250/636: Performed by: PHYSICIAN ASSISTANT

## 2022-01-18 PROCEDURE — 77030039267 HC ADH SKN EXOFIN S2SG -B: Performed by: ORTHOPAEDIC SURGERY

## 2022-01-18 PROCEDURE — 77030038692 HC WND DEB SYS IRMX -B: Performed by: ORTHOPAEDIC SURGERY

## 2022-01-18 PROCEDURE — 27130 TOTAL HIP ARTHROPLASTY: CPT | Performed by: ORTHOPAEDIC SURGERY

## 2022-01-18 PROCEDURE — 94640 AIRWAY INHALATION TREATMENT: CPT

## 2022-01-18 PROCEDURE — 76010000172 HC OR TIME 2.5 TO 3 HR INTENSV-TIER 1: Performed by: ORTHOPAEDIC SURGERY

## 2022-01-18 PROCEDURE — 77030020788: Performed by: ORTHOPAEDIC SURGERY

## 2022-01-18 PROCEDURE — 74011250637 HC RX REV CODE- 250/637: Performed by: PHYSICIAN ASSISTANT

## 2022-01-18 PROCEDURE — C9290 INJ, BUPIVACAINE LIPOSOME: HCPCS | Performed by: ORTHOPAEDIC SURGERY

## 2022-01-18 PROCEDURE — 77030008684 HC TU ET CUF COVD -B: Performed by: NURSE ANESTHETIST, CERTIFIED REGISTERED

## 2022-01-18 PROCEDURE — 76060000036 HC ANESTHESIA 2.5 TO 3 HR: Performed by: ORTHOPAEDIC SURGERY

## 2022-01-18 PROCEDURE — 97161 PT EVAL LOW COMPLEX 20 MIN: CPT

## 2022-01-18 DEVICE — APEX HOLE ELIMINATOR - PS
Type: IMPLANTABLE DEVICE | Site: HIP | Status: FUNCTIONAL
Brand: APEX

## 2022-01-18 DEVICE — PINNACLE HIP SOLUTIONS ALTRX POLYETHYLENE ACETABULAR LINER NEUTRAL 36MM ID 52MM OD
Type: IMPLANTABLE DEVICE | Site: HIP | Status: FUNCTIONAL
Brand: PINNACLE ALTRX

## 2022-01-18 DEVICE — PINNACLE CANCELLOUS BONE SCREW 6.5MM X 40MM
Type: IMPLANTABLE DEVICE | Site: HIP | Status: FUNCTIONAL
Brand: PINNACLE

## 2022-01-18 DEVICE — ACTIS DUOFIX HIP PROSTHESIS (FEMORAL STEM 12/14 TAPER CEMENTLESS SIZE 2 STD COLLAR)  CE
Type: IMPLANTABLE DEVICE | Site: HIP | Status: FUNCTIONAL
Brand: ACTIS

## 2022-01-18 DEVICE — PINNACLE GRIPTION ACETABULAR SHELL SECTOR 52MM OD
Type: IMPLANTABLE DEVICE | Site: HIP | Status: FUNCTIONAL
Brand: PINNACLE GRIPTION

## 2022-01-18 DEVICE — BIOLOX DELTA CERAMIC FEMORAL HEAD +5.0 36MM DIA 12/14 TAPER
Type: IMPLANTABLE DEVICE | Site: HIP | Status: FUNCTIONAL
Brand: BIOLOX DELTA

## 2022-01-18 DEVICE — HIP H2 TOT ADV OTHER HD IMPL CAPPED SYNTHES: Type: IMPLANTABLE DEVICE | Status: FUNCTIONAL

## 2022-01-18 RX ORDER — LIDOCAINE HYDROCHLORIDE 20 MG/ML
INJECTION, SOLUTION EPIDURAL; INFILTRATION; INTRACAUDAL; PERINEURAL AS NEEDED
Status: DISCONTINUED | OUTPATIENT
Start: 2022-01-18 | End: 2022-01-18 | Stop reason: HOSPADM

## 2022-01-18 RX ORDER — SUCCINYLCHOLINE CHLORIDE 20 MG/ML
INJECTION INTRAMUSCULAR; INTRAVENOUS AS NEEDED
Status: DISCONTINUED | OUTPATIENT
Start: 2022-01-18 | End: 2022-01-18 | Stop reason: HOSPADM

## 2022-01-18 RX ORDER — ONDANSETRON 2 MG/ML
INJECTION INTRAMUSCULAR; INTRAVENOUS AS NEEDED
Status: DISCONTINUED | OUTPATIENT
Start: 2022-01-18 | End: 2022-01-18 | Stop reason: HOSPADM

## 2022-01-18 RX ORDER — DIPHENHYDRAMINE HYDROCHLORIDE 50 MG/ML
12.5 INJECTION, SOLUTION INTRAMUSCULAR; INTRAVENOUS AS NEEDED
Status: DISCONTINUED | OUTPATIENT
Start: 2022-01-18 | End: 2022-01-18 | Stop reason: HOSPADM

## 2022-01-18 RX ORDER — DEXAMETHASONE SODIUM PHOSPHATE 4 MG/ML
INJECTION, SOLUTION INTRA-ARTICULAR; INTRALESIONAL; INTRAMUSCULAR; INTRAVENOUS; SOFT TISSUE AS NEEDED
Status: DISCONTINUED | OUTPATIENT
Start: 2022-01-18 | End: 2022-01-18 | Stop reason: HOSPADM

## 2022-01-18 RX ORDER — GABAPENTIN 100 MG/1
200 CAPSULE ORAL 4 TIMES DAILY
Status: DISCONTINUED | OUTPATIENT
Start: 2022-01-18 | End: 2022-01-20 | Stop reason: HOSPADM

## 2022-01-18 RX ORDER — LEVALBUTEROL INHALATION SOLUTION 0.63 MG/3ML
0.63 SOLUTION RESPIRATORY (INHALATION)
Status: DISCONTINUED | OUTPATIENT
Start: 2022-01-18 | End: 2022-01-19 | Stop reason: CLARIF

## 2022-01-18 RX ORDER — HYDROMORPHONE HYDROCHLORIDE 2 MG/ML
INJECTION, SOLUTION INTRAMUSCULAR; INTRAVENOUS; SUBCUTANEOUS AS NEEDED
Status: DISCONTINUED | OUTPATIENT
Start: 2022-01-18 | End: 2022-01-18 | Stop reason: HOSPADM

## 2022-01-18 RX ORDER — KETAMINE HYDROCHLORIDE 10 MG/ML
INJECTION, SOLUTION INTRAMUSCULAR; INTRAVENOUS AS NEEDED
Status: DISCONTINUED | OUTPATIENT
Start: 2022-01-18 | End: 2022-01-18 | Stop reason: HOSPADM

## 2022-01-18 RX ORDER — HYDROCHLOROTHIAZIDE 25 MG/1
25 TABLET ORAL EVERY EVENING
Status: DISCONTINUED | OUTPATIENT
Start: 2022-01-18 | End: 2022-01-20 | Stop reason: HOSPADM

## 2022-01-18 RX ORDER — FACIAL-BODY WIPES
10 EACH TOPICAL DAILY PRN
Status: DISCONTINUED | OUTPATIENT
Start: 2022-01-20 | End: 2022-01-20 | Stop reason: HOSPADM

## 2022-01-18 RX ORDER — SODIUM CHLORIDE 0.9 % (FLUSH) 0.9 %
5-40 SYRINGE (ML) INJECTION AS NEEDED
Status: DISCONTINUED | OUTPATIENT
Start: 2022-01-18 | End: 2022-01-20 | Stop reason: HOSPADM

## 2022-01-18 RX ORDER — ONDANSETRON 2 MG/ML
4 INJECTION INTRAMUSCULAR; INTRAVENOUS AS NEEDED
Status: DISCONTINUED | OUTPATIENT
Start: 2022-01-18 | End: 2022-01-18 | Stop reason: HOSPADM

## 2022-01-18 RX ORDER — SODIUM CHLORIDE 0.9 % (FLUSH) 0.9 %
5-40 SYRINGE (ML) INJECTION EVERY 8 HOURS
Status: DISCONTINUED | OUTPATIENT
Start: 2022-01-18 | End: 2022-01-20 | Stop reason: HOSPADM

## 2022-01-18 RX ORDER — SODIUM CHLORIDE 9 MG/ML
25 INJECTION, SOLUTION INTRAVENOUS CONTINUOUS
Status: DISCONTINUED | OUTPATIENT
Start: 2022-01-18 | End: 2022-01-18 | Stop reason: HOSPADM

## 2022-01-18 RX ORDER — HYDROMORPHONE HYDROCHLORIDE 1 MG/ML
1 INJECTION, SOLUTION INTRAMUSCULAR; INTRAVENOUS; SUBCUTANEOUS
Status: DISPENSED | OUTPATIENT
Start: 2022-01-18 | End: 2022-01-19

## 2022-01-18 RX ORDER — NEBULIZER AND COMPRESSOR
1 EACH MISCELLANEOUS
Status: DISCONTINUED | OUTPATIENT
Start: 2022-01-18 | End: 2022-01-18

## 2022-01-18 RX ORDER — HYDROMORPHONE HYDROCHLORIDE 2 MG/1
2 TABLET ORAL
Status: DISCONTINUED | OUTPATIENT
Start: 2022-01-18 | End: 2022-01-19

## 2022-01-18 RX ORDER — FENTANYL CITRATE 50 UG/ML
25 INJECTION, SOLUTION INTRAMUSCULAR; INTRAVENOUS
Status: DISCONTINUED | OUTPATIENT
Start: 2022-01-18 | End: 2022-01-18 | Stop reason: HOSPADM

## 2022-01-18 RX ORDER — MIDAZOLAM HYDROCHLORIDE 1 MG/ML
0.5 INJECTION, SOLUTION INTRAMUSCULAR; INTRAVENOUS
Status: DISCONTINUED | OUTPATIENT
Start: 2022-01-18 | End: 2022-01-18 | Stop reason: HOSPADM

## 2022-01-18 RX ORDER — MIDAZOLAM HYDROCHLORIDE 1 MG/ML
1 INJECTION, SOLUTION INTRAMUSCULAR; INTRAVENOUS AS NEEDED
Status: DISCONTINUED | OUTPATIENT
Start: 2022-01-18 | End: 2022-01-18 | Stop reason: HOSPADM

## 2022-01-18 RX ORDER — PROPOFOL 10 MG/ML
INJECTION, EMULSION INTRAVENOUS AS NEEDED
Status: DISCONTINUED | OUTPATIENT
Start: 2022-01-18 | End: 2022-01-18 | Stop reason: HOSPADM

## 2022-01-18 RX ORDER — MORPHINE SULFATE 2 MG/ML
2 INJECTION, SOLUTION INTRAMUSCULAR; INTRAVENOUS
Status: DISCONTINUED | OUTPATIENT
Start: 2022-01-18 | End: 2022-01-18 | Stop reason: HOSPADM

## 2022-01-18 RX ORDER — SODIUM CHLORIDE, SODIUM LACTATE, POTASSIUM CHLORIDE, CALCIUM CHLORIDE 600; 310; 30; 20 MG/100ML; MG/100ML; MG/100ML; MG/100ML
1000 INJECTION, SOLUTION INTRAVENOUS CONTINUOUS
Status: DISCONTINUED | OUTPATIENT
Start: 2022-01-18 | End: 2022-01-18 | Stop reason: HOSPADM

## 2022-01-18 RX ORDER — MECLIZINE HCL 12.5 MG 12.5 MG/1
25 TABLET ORAL
Status: DISCONTINUED | OUTPATIENT
Start: 2022-01-18 | End: 2022-01-20 | Stop reason: HOSPADM

## 2022-01-18 RX ORDER — ROPIVACAINE HYDROCHLORIDE 5 MG/ML
150 INJECTION, SOLUTION EPIDURAL; INFILTRATION; PERINEURAL AS NEEDED
Status: DISCONTINUED | OUTPATIENT
Start: 2022-01-18 | End: 2022-01-18 | Stop reason: HOSPADM

## 2022-01-18 RX ORDER — LIDOCAINE HYDROCHLORIDE 10 MG/ML
0.1 INJECTION, SOLUTION EPIDURAL; INFILTRATION; INTRACAUDAL; PERINEURAL AS NEEDED
Status: DISCONTINUED | OUTPATIENT
Start: 2022-01-18 | End: 2022-01-18 | Stop reason: HOSPADM

## 2022-01-18 RX ORDER — MIDAZOLAM HYDROCHLORIDE 1 MG/ML
INJECTION, SOLUTION INTRAMUSCULAR; INTRAVENOUS AS NEEDED
Status: DISCONTINUED | OUTPATIENT
Start: 2022-01-18 | End: 2022-01-18 | Stop reason: HOSPADM

## 2022-01-18 RX ORDER — ACETAMINOPHEN 325 MG/1
650 TABLET ORAL ONCE
Status: DISCONTINUED | OUTPATIENT
Start: 2022-01-18 | End: 2022-01-18 | Stop reason: HOSPADM

## 2022-01-18 RX ORDER — DEXMEDETOMIDINE HYDROCHLORIDE 100 UG/ML
INJECTION, SOLUTION INTRAVENOUS AS NEEDED
Status: DISCONTINUED | OUTPATIENT
Start: 2022-01-18 | End: 2022-01-18 | Stop reason: HOSPADM

## 2022-01-18 RX ORDER — HYDROMORPHONE HYDROCHLORIDE 1 MG/ML
0.2 INJECTION, SOLUTION INTRAMUSCULAR; INTRAVENOUS; SUBCUTANEOUS
Status: ACTIVE | OUTPATIENT
Start: 2022-01-18 | End: 2022-01-18

## 2022-01-18 RX ORDER — HYDROMORPHONE HYDROCHLORIDE 2 MG/1
4 TABLET ORAL
Status: DISCONTINUED | OUTPATIENT
Start: 2022-01-18 | End: 2022-01-19

## 2022-01-18 RX ORDER — ONDANSETRON 2 MG/ML
4 INJECTION INTRAMUSCULAR; INTRAVENOUS
Status: ACTIVE | OUTPATIENT
Start: 2022-01-18 | End: 2022-01-20

## 2022-01-18 RX ORDER — FENTANYL CITRATE 50 UG/ML
INJECTION, SOLUTION INTRAMUSCULAR; INTRAVENOUS AS NEEDED
Status: DISCONTINUED | OUTPATIENT
Start: 2022-01-18 | End: 2022-01-18 | Stop reason: HOSPADM

## 2022-01-18 RX ORDER — NALOXONE HYDROCHLORIDE 0.4 MG/ML
0.4 INJECTION, SOLUTION INTRAMUSCULAR; INTRAVENOUS; SUBCUTANEOUS AS NEEDED
Status: DISCONTINUED | OUTPATIENT
Start: 2022-01-18 | End: 2022-01-20 | Stop reason: HOSPADM

## 2022-01-18 RX ORDER — PANTOPRAZOLE SODIUM 40 MG/1
40 TABLET, DELAYED RELEASE ORAL
Status: DISCONTINUED | OUTPATIENT
Start: 2022-01-19 | End: 2022-01-20 | Stop reason: HOSPADM

## 2022-01-18 RX ORDER — HYDROXYZINE HYDROCHLORIDE 10 MG/1
10 TABLET, FILM COATED ORAL
Status: DISCONTINUED | OUTPATIENT
Start: 2022-01-18 | End: 2022-01-20 | Stop reason: HOSPADM

## 2022-01-18 RX ORDER — AMANTADINE HYDROCHLORIDE 100 MG/1
100 CAPSULE, GELATIN COATED ORAL EVERY EVENING
Status: DISCONTINUED | OUTPATIENT
Start: 2022-01-18 | End: 2022-01-20 | Stop reason: HOSPADM

## 2022-01-18 RX ORDER — ROPINIROLE 6 MG/1
6 TABLET, FILM COATED, EXTENDED RELEASE ORAL EVERY EVENING
Status: DISCONTINUED | OUTPATIENT
Start: 2022-01-18 | End: 2022-01-20 | Stop reason: HOSPADM

## 2022-01-18 RX ORDER — ROCURONIUM BROMIDE 10 MG/ML
INJECTION, SOLUTION INTRAVENOUS AS NEEDED
Status: DISCONTINUED | OUTPATIENT
Start: 2022-01-18 | End: 2022-01-18 | Stop reason: HOSPADM

## 2022-01-18 RX ORDER — POLYETHYLENE GLYCOL 3350 17 G/17G
17 POWDER, FOR SOLUTION ORAL AS NEEDED
Status: DISCONTINUED | OUTPATIENT
Start: 2022-01-18 | End: 2022-01-20 | Stop reason: HOSPADM

## 2022-01-18 RX ORDER — RASAGILINE 1 MG/1
1 TABLET ORAL EVERY EVENING
Status: DISCONTINUED | OUTPATIENT
Start: 2022-01-18 | End: 2022-01-20 | Stop reason: HOSPADM

## 2022-01-18 RX ORDER — POLYETHYLENE GLYCOL 3350 17 G/17G
17 POWDER, FOR SOLUTION ORAL DAILY
Status: DISCONTINUED | OUTPATIENT
Start: 2022-01-18 | End: 2022-01-20 | Stop reason: HOSPADM

## 2022-01-18 RX ORDER — BUDESONIDE 0.25 MG/2ML
250 INHALANT ORAL
Status: DISCONTINUED | OUTPATIENT
Start: 2022-01-18 | End: 2022-01-20 | Stop reason: HOSPADM

## 2022-01-18 RX ORDER — TRANEXAMIC ACID 100 MG/ML
INJECTION, SOLUTION INTRAVENOUS AS NEEDED
Status: DISCONTINUED | OUTPATIENT
Start: 2022-01-18 | End: 2022-01-18 | Stop reason: HOSPADM

## 2022-01-18 RX ORDER — SODIUM CHLORIDE, SODIUM LACTATE, POTASSIUM CHLORIDE, CALCIUM CHLORIDE 600; 310; 30; 20 MG/100ML; MG/100ML; MG/100ML; MG/100ML
100 INJECTION, SOLUTION INTRAVENOUS CONTINUOUS
Status: DISCONTINUED | OUTPATIENT
Start: 2022-01-18 | End: 2022-01-18 | Stop reason: HOSPADM

## 2022-01-18 RX ORDER — CARBIDOPA AND LEVODOPA 25; 100 MG/1; MG/1
1 TABLET ORAL 4 TIMES DAILY
Status: DISCONTINUED | OUTPATIENT
Start: 2022-01-18 | End: 2022-01-20 | Stop reason: HOSPADM

## 2022-01-18 RX ORDER — SODIUM CHLORIDE, SODIUM LACTATE, POTASSIUM CHLORIDE, CALCIUM CHLORIDE 600; 310; 30; 20 MG/100ML; MG/100ML; MG/100ML; MG/100ML
INJECTION, SOLUTION INTRAVENOUS
Status: DISCONTINUED | OUTPATIENT
Start: 2022-01-18 | End: 2022-01-18

## 2022-01-18 RX ORDER — FENTANYL CITRATE 50 UG/ML
50 INJECTION, SOLUTION INTRAMUSCULAR; INTRAVENOUS AS NEEDED
Status: DISCONTINUED | OUTPATIENT
Start: 2022-01-18 | End: 2022-01-18 | Stop reason: HOSPADM

## 2022-01-18 RX ORDER — MONTELUKAST SODIUM 10 MG/1
10 TABLET ORAL EVERY EVENING
Status: DISCONTINUED | OUTPATIENT
Start: 2022-01-18 | End: 2022-01-20 | Stop reason: HOSPADM

## 2022-01-18 RX ORDER — AMOXICILLIN 250 MG
1 CAPSULE ORAL 2 TIMES DAILY
Status: DISCONTINUED | OUTPATIENT
Start: 2022-01-18 | End: 2022-01-20 | Stop reason: HOSPADM

## 2022-01-18 RX ORDER — ACETAMINOPHEN 500 MG
1000 TABLET ORAL EVERY 6 HOURS
Status: DISCONTINUED | OUTPATIENT
Start: 2022-01-18 | End: 2022-01-20 | Stop reason: HOSPADM

## 2022-01-18 RX ORDER — ATORVASTATIN CALCIUM 10 MG/1
10 TABLET, FILM COATED ORAL EVERY EVENING
Status: DISCONTINUED | OUTPATIENT
Start: 2022-01-18 | End: 2022-01-20 | Stop reason: HOSPADM

## 2022-01-18 RX ORDER — SODIUM CHLORIDE 9 MG/ML
125 INJECTION, SOLUTION INTRAVENOUS CONTINUOUS
Status: DISPENSED | OUTPATIENT
Start: 2022-01-18 | End: 2022-01-19

## 2022-01-18 RX ORDER — EPHEDRINE SULFATE/0.9% NACL/PF 50 MG/5 ML
5 SYRINGE (ML) INTRAVENOUS AS NEEDED
Status: DISCONTINUED | OUTPATIENT
Start: 2022-01-18 | End: 2022-01-18 | Stop reason: HOSPADM

## 2022-01-18 RX ADMIN — ACETAMINOPHEN 1000 MG: 500 TABLET ORAL at 18:30

## 2022-01-18 RX ADMIN — SODIUM CHLORIDE, POTASSIUM CHLORIDE, SODIUM LACTATE AND CALCIUM CHLORIDE 1000 ML: 600; 310; 30; 20 INJECTION, SOLUTION INTRAVENOUS at 11:00

## 2022-01-18 RX ADMIN — ROCURONIUM BROMIDE 5 MG: 10 SOLUTION INTRAVENOUS at 11:09

## 2022-01-18 RX ADMIN — PROPOFOL 50 MG: 10 INJECTION, EMULSION INTRAVENOUS at 12:30

## 2022-01-18 RX ADMIN — DEXMEDETOMIDINE HYDROCHLORIDE 8 MCG: 100 INJECTION, SOLUTION, CONCENTRATE INTRAVENOUS at 13:19

## 2022-01-18 RX ADMIN — WATER 2 G: 1 INJECTION INTRAMUSCULAR; INTRAVENOUS; SUBCUTANEOUS at 11:30

## 2022-01-18 RX ADMIN — PROPOFOL 100 MG: 10 INJECTION, EMULSION INTRAVENOUS at 11:09

## 2022-01-18 RX ADMIN — PHENYLEPHRINE HYDROCHLORIDE 40 MCG/MIN: 10 INJECTION INTRAVENOUS at 11:26

## 2022-01-18 RX ADMIN — SODIUM CHLORIDE, POTASSIUM CHLORIDE, SODIUM LACTATE AND CALCIUM CHLORIDE: 600; 310; 30; 20 INJECTION, SOLUTION INTRAVENOUS at 13:38

## 2022-01-18 RX ADMIN — ROPINIROLE 6 MG: 6 TABLET, FILM COATED, EXTENDED RELEASE ORAL at 22:46

## 2022-01-18 RX ADMIN — ROCURONIUM BROMIDE 15 MG: 10 SOLUTION INTRAVENOUS at 11:52

## 2022-01-18 RX ADMIN — ROCURONIUM BROMIDE 30 MG: 10 SOLUTION INTRAVENOUS at 11:30

## 2022-01-18 RX ADMIN — ONDANSETRON HYDROCHLORIDE 4 MG: 2 INJECTION, SOLUTION INTRAMUSCULAR; INTRAVENOUS at 13:05

## 2022-01-18 RX ADMIN — SODIUM CHLORIDE, PRESERVATIVE FREE 5 ML: 5 INJECTION INTRAVENOUS at 22:00

## 2022-01-18 RX ADMIN — WATER 2 G: 1 INJECTION INTRAMUSCULAR; INTRAVENOUS; SUBCUTANEOUS at 18:30

## 2022-01-18 RX ADMIN — SUCCINYLCHOLINE CHLORIDE 120 MG: 20 INJECTION, SOLUTION INTRAMUSCULAR; INTRAVENOUS at 11:09

## 2022-01-18 RX ADMIN — MONTELUKAST 10 MG: 10 TABLET, FILM COATED ORAL at 18:30

## 2022-01-18 RX ADMIN — MIDAZOLAM 1 MG: 1 INJECTION INTRAMUSCULAR; INTRAVENOUS at 11:07

## 2022-01-18 RX ADMIN — Medication 30 MG: at 11:09

## 2022-01-18 RX ADMIN — FENTANYL CITRATE 25 MCG: 50 INJECTION, SOLUTION INTRAMUSCULAR; INTRAVENOUS at 11:09

## 2022-01-18 RX ADMIN — HYDROMORPHONE HYDROCHLORIDE 0.5 MG: 2 INJECTION, SOLUTION INTRAMUSCULAR; INTRAVENOUS; SUBCUTANEOUS at 12:04

## 2022-01-18 RX ADMIN — CARBIDOPA AND LEVODOPA 1 TABLET: 25; 100 TABLET ORAL at 18:30

## 2022-01-18 RX ADMIN — BUDESONIDE 250 MCG: 0.25 INHALANT RESPIRATORY (INHALATION) at 21:57

## 2022-01-18 RX ADMIN — SODIUM CHLORIDE, PRESERVATIVE FREE 10 ML: 5 INJECTION INTRAVENOUS at 14:00

## 2022-01-18 RX ADMIN — LIDOCAINE HYDROCHLORIDE 60 MG: 20 INJECTION, SOLUTION EPIDURAL; INFILTRATION; INTRACAUDAL; PERINEURAL at 11:09

## 2022-01-18 RX ADMIN — ATORVASTATIN CALCIUM 10 MG: 10 TABLET, FILM COATED ORAL at 18:30

## 2022-01-18 RX ADMIN — Medication 10 MG: at 11:52

## 2022-01-18 RX ADMIN — FENTANYL CITRATE 75 MCG: 50 INJECTION, SOLUTION INTRAMUSCULAR; INTRAVENOUS at 11:39

## 2022-01-18 RX ADMIN — GABAPENTIN 200 MG: 100 CAPSULE ORAL at 18:30

## 2022-01-18 RX ADMIN — HYDROMORPHONE HYDROCHLORIDE 0.5 MG: 2 INJECTION, SOLUTION INTRAMUSCULAR; INTRAVENOUS; SUBCUTANEOUS at 13:23

## 2022-01-18 RX ADMIN — CARBIDOPA AND LEVODOPA 1 TABLET: 25; 100 TABLET ORAL at 22:45

## 2022-01-18 RX ADMIN — SENNOSIDES AND DOCUSATE SODIUM 1 TABLET: 50; 8.6 TABLET ORAL at 22:45

## 2022-01-18 RX ADMIN — ACETAMINOPHEN 1000 MG: 500 TABLET ORAL at 11:00

## 2022-01-18 RX ADMIN — RASAGILINE 1 MG: 1 TABLET ORAL at 22:47

## 2022-01-18 RX ADMIN — SODIUM CHLORIDE 125 ML/HR: 9 INJECTION, SOLUTION INTRAVENOUS at 14:31

## 2022-01-18 RX ADMIN — HYDROMORPHONE HYDROCHLORIDE 1 MG: 1 INJECTION, SOLUTION INTRAMUSCULAR; INTRAVENOUS; SUBCUTANEOUS at 15:56

## 2022-01-18 RX ADMIN — DEXMEDETOMIDINE HYDROCHLORIDE 8 MCG: 100 INJECTION, SOLUTION, CONCENTRATE INTRAVENOUS at 11:04

## 2022-01-18 RX ADMIN — SUGAMMADEX 200 MG: 100 INJECTION, SOLUTION INTRAVENOUS at 13:03

## 2022-01-18 RX ADMIN — DEXAMETHASONE SODIUM PHOSPHATE 4 MG: 4 INJECTION, SOLUTION INTRAMUSCULAR; INTRAVENOUS at 11:15

## 2022-01-18 RX ADMIN — DEXMEDETOMIDINE HYDROCHLORIDE 4 MCG: 100 INJECTION, SOLUTION, CONCENTRATE INTRAVENOUS at 13:29

## 2022-01-18 RX ADMIN — MIDAZOLAM 1 MG: 1 INJECTION INTRAMUSCULAR; INTRAVENOUS at 11:04

## 2022-01-18 RX ADMIN — HYDROCHLOROTHIAZIDE 25 MG: 25 TABLET ORAL at 18:30

## 2022-01-18 RX ADMIN — GABAPENTIN 200 MG: 100 CAPSULE ORAL at 22:45

## 2022-01-18 RX ADMIN — AMANTADINE HYDROCHLORIDE 100 MG: 100 CAPSULE ORAL at 18:30

## 2022-01-18 NOTE — ANESTHESIA POSTPROCEDURE EVALUATION
Post-Anesthesia Evaluation and Assessment    Patient: Andrez Godinez MRN: [id number]  SSN: [id number]    YOB: 1955  Age: 74 y.o. Sex: female      I have evaluated the patient and they are stable and ready for discharge from the PACU. Cardiovascular Function/Vital Signs  Visit Vitals  BP (!) 106/57   Pulse 77   Temp 36.5 °C (97.7 °F)   Resp 14   Ht 5' 1\" (1.549 m)   Wt 78.1 kg (172 lb 2.9 oz)   SpO2 99%   BMI 32.53 kg/m²       Patient is status post General anesthesia for Procedure(s):  RIGHT TOTAL HIP ARTHROPLASTY ANTERIOR APPROACH. Nausea/Vomiting: None    Postoperative hydration reviewed and adequate. Pain:  Pain Scale 1: FLACC (01/18/22 1351)  Pain Intensity 1: 0 (01/18/22 1351)   Managed    Neurological Status:   Neuro (WDL): Exceptions to WDL (01/18/22 1351)  Neuro  Neurologic State: Anesthetized; Lethargic (01/18/22 1351)   At baseline    Mental Status, Level of Consciousness: Alert and  oriented to person, place, and time    Pulmonary Status:   O2 Device: CO2 nasal cannula (01/18/22 1351)   Adequate oxygenation and airway patent    Complications related to anesthesia: None    Post-anesthesia assessment completed. No concerns    Signed By: Natalia Harley MD     January 18, 2022              Procedure(s):  RIGHT TOTAL HIP ARTHROPLASTY ANTERIOR APPROACH. general    <BSHSIANPOST>    INITIAL Post-op Vital signs:   Vitals Value Taken Time   /72 01/18/22 1405   Temp 36.5 °C (97.7 °F) 01/18/22 1351   Pulse 79 01/18/22 1406   Resp 15 01/18/22 1406   SpO2 96 % 01/18/22 1406   Vitals shown include unvalidated device data.

## 2022-01-18 NOTE — PROGRESS NOTES
Occupational Therapy Note:     Pt is POD 0 from [facility]. Pt has not been identified as a Fast Track at this time. Will follow up tomorrow for evaluation and training.      Jerryl Holter, OT

## 2022-01-18 NOTE — PROGRESS NOTES
TRANSFER - IN REPORT:    Verbal report received from Mala (name) on [facility]  being received from PACU (unit) for routine progression of care      Report consisted of patients Situation, Background, Assessment and   Recommendations(SBAR). Information from the following report(s) Procedure Summary, MAR and Cardiac Rhythm NSR was reviewed with the receiving nurse. Opportunity for questions and clarification was provided. Assessment completed upon patients arrival to unit and care assumed.

## 2022-01-18 NOTE — ANESTHESIA PREPROCEDURE EVALUATION
Relevant Problems   No relevant active problems       Anesthetic History   No history of anesthetic complications            Review of Systems / Medical History  Patient summary reviewed, nursing notes reviewed and pertinent labs reviewed    Pulmonary            Asthma        Neuro/Psych   Within defined limits           Cardiovascular    Hypertension                   GI/Hepatic/Renal     GERD      Liver disease     Endo/Other        Arthritis     Other Findings   Comments: Parkinson's           Physical Exam    Airway  Mallampati: II  TM Distance: > 6 cm  Neck ROM: normal range of motion   Mouth opening: Normal     Cardiovascular  Regular rate and rhythm,  S1 and S2 normal,  no murmur, click, rub, or gallop             Dental  No notable dental hx       Pulmonary  Breath sounds clear to auscultation               Abdominal  GI exam deferred       Other Findings            Anesthetic Plan    ASA: 3  Anesthesia type: general          Induction: Intravenous  Anesthetic plan and risks discussed with: Patient

## 2022-01-18 NOTE — PERIOP NOTES
TRANSFER - OUT REPORT:    Verbal report given to DIMPLE Gaona RN on [employer]  being transferred to [facility], Room 565 for routine post - op       Report consisted of patients Situation, Background, Assessment and   Recommendations(SBAR). Time Pre op antibiotic given: 11:30  Anesthesia Stop time: 13:52  [place] Present on Transfer to floor: No    Information from the following report(s) SBAR, Procedure Summary, Intake/Output and Cardiac Rhythm NSR was reviewed with the receiving nurse. Opportunity for questions and clarification was provided. Is the patient on 02? NO    Is the patient on a monitor? NO    Is the nurse transporting with the patient? NO    Surgical Waiting Area notified of patient's transfer from PACU? YES      The following personal items collected during your admission accompanied patient upon transfer:   Dental Appliance:    Vision:    Hearing Aid:    Jewelry:    Clothing: Clothing: At bedside,Other (comment) (Clothing/belonging bag returned to patient in PACU.)  Other Valuables:  Other Valuables: Eyeglasses,At bedside (Returned to patient in PACU.)  Valuables sent to safe:

## 2022-01-19 LAB
ANION GAP SERPL CALC-SCNC: 4 MMOL/L (ref 5–15)
BUN SERPL-MCNC: 17 MG/DL (ref 6–20)
BUN/CREAT SERPL: 16 (ref 12–20)
CALCIUM SERPL-MCNC: 8.3 MG/DL (ref 8.5–10.1)
CHLORIDE SERPL-SCNC: 110 MMOL/L (ref 97–108)
CO2 SERPL-SCNC: 28 MMOL/L (ref 21–32)
CREAT SERPL-MCNC: 1.05 MG/DL (ref 0.55–1.02)
GLUCOSE SERPL-MCNC: 116 MG/DL (ref 65–100)
HGB BLD-MCNC: 10.2 G/DL (ref 11.5–16)
POTASSIUM SERPL-SCNC: 3.7 MMOL/L (ref 3.5–5.1)
SODIUM SERPL-SCNC: 142 MMOL/L (ref 136–145)

## 2022-01-19 PROCEDURE — 74011250636 HC RX REV CODE- 250/636: Performed by: PHYSICIAN ASSISTANT

## 2022-01-19 PROCEDURE — 77030029684 HC NEB SM VOL KT MONA -A

## 2022-01-19 PROCEDURE — 85018 HEMOGLOBIN: CPT

## 2022-01-19 PROCEDURE — 94640 AIRWAY INHALATION TREATMENT: CPT

## 2022-01-19 PROCEDURE — 97530 THERAPEUTIC ACTIVITIES: CPT | Performed by: PHYSICAL THERAPIST

## 2022-01-19 PROCEDURE — 74011250637 HC RX REV CODE- 250/637: Performed by: PHYSICIAN ASSISTANT

## 2022-01-19 PROCEDURE — 94664 DEMO&/EVAL PT USE INHALER: CPT

## 2022-01-19 PROCEDURE — 80048 BASIC METABOLIC PNL TOTAL CA: CPT

## 2022-01-19 PROCEDURE — 97165 OT EVAL LOW COMPLEX 30 MIN: CPT

## 2022-01-19 PROCEDURE — 97535 SELF CARE MNGMENT TRAINING: CPT

## 2022-01-19 PROCEDURE — 74011000250 HC RX REV CODE- 250: Performed by: PHYSICIAN ASSISTANT

## 2022-01-19 PROCEDURE — 36415 COLL VENOUS BLD VENIPUNCTURE: CPT

## 2022-01-19 PROCEDURE — 97116 GAIT TRAINING THERAPY: CPT | Performed by: PHYSICAL THERAPIST

## 2022-01-19 RX ORDER — HYDROMORPHONE HYDROCHLORIDE 2 MG/1
2 TABLET ORAL
Status: DISCONTINUED | OUTPATIENT
Start: 2022-01-19 | End: 2022-01-20 | Stop reason: HOSPADM

## 2022-01-19 RX ORDER — HYDROMORPHONE HYDROCHLORIDE 2 MG/1
4 TABLET ORAL
Status: DISCONTINUED | OUTPATIENT
Start: 2022-01-19 | End: 2022-01-20 | Stop reason: HOSPADM

## 2022-01-19 RX ORDER — HYDROMORPHONE HYDROCHLORIDE 2 MG/1
2-4 TABLET ORAL
Qty: 60 TABLET | Refills: 0 | Status: SHIPPED | OUTPATIENT
Start: 2022-01-19 | End: 2022-02-02

## 2022-01-19 RX ORDER — ALBUTEROL SULFATE 0.83 MG/ML
1.25 SOLUTION RESPIRATORY (INHALATION)
Status: DISCONTINUED | OUTPATIENT
Start: 2022-01-19 | End: 2022-01-20 | Stop reason: HOSPADM

## 2022-01-19 RX ADMIN — HYDROMORPHONE HYDROCHLORIDE 4 MG: 2 TABLET ORAL at 12:24

## 2022-01-19 RX ADMIN — HYDROMORPHONE HYDROCHLORIDE 4 MG: 2 TABLET ORAL at 04:29

## 2022-01-19 RX ADMIN — SODIUM CHLORIDE, PRESERVATIVE FREE 10 ML: 5 INJECTION INTRAVENOUS at 18:07

## 2022-01-19 RX ADMIN — AMANTADINE HYDROCHLORIDE 100 MG: 100 CAPSULE ORAL at 18:01

## 2022-01-19 RX ADMIN — ACETAMINOPHEN 1000 MG: 500 TABLET ORAL at 12:24

## 2022-01-19 RX ADMIN — GABAPENTIN 200 MG: 100 CAPSULE ORAL at 22:44

## 2022-01-19 RX ADMIN — CARBIDOPA AND LEVODOPA 1 TABLET: 25; 100 TABLET ORAL at 22:44

## 2022-01-19 RX ADMIN — HYDROMORPHONE HYDROCHLORIDE 1 MG: 1 INJECTION, SOLUTION INTRAMUSCULAR; INTRAVENOUS; SUBCUTANEOUS at 07:06

## 2022-01-19 RX ADMIN — ACETAMINOPHEN 1000 MG: 500 TABLET ORAL at 07:05

## 2022-01-19 RX ADMIN — CARBIDOPA AND LEVODOPA 1 TABLET: 25; 100 TABLET ORAL at 18:02

## 2022-01-19 RX ADMIN — MONTELUKAST 10 MG: 10 TABLET, FILM COATED ORAL at 18:01

## 2022-01-19 RX ADMIN — HYDROMORPHONE HYDROCHLORIDE 4 MG: 2 TABLET ORAL at 21:06

## 2022-01-19 RX ADMIN — GABAPENTIN 200 MG: 100 CAPSULE ORAL at 18:01

## 2022-01-19 RX ADMIN — PANTOPRAZOLE SODIUM 40 MG: 40 TABLET, DELAYED RELEASE ORAL at 07:06

## 2022-01-19 RX ADMIN — CARBIDOPA AND LEVODOPA 1 TABLET: 25; 100 TABLET ORAL at 09:20

## 2022-01-19 RX ADMIN — RIVAROXABAN 20 MG: 20 TABLET, FILM COATED ORAL at 07:06

## 2022-01-19 RX ADMIN — ATORVASTATIN CALCIUM 10 MG: 10 TABLET, FILM COATED ORAL at 18:02

## 2022-01-19 RX ADMIN — BUDESONIDE 250 MCG: 0.25 INHALANT RESPIRATORY (INHALATION) at 09:42

## 2022-01-19 RX ADMIN — RASAGILINE 1 MG: 1 TABLET ORAL at 18:04

## 2022-01-19 RX ADMIN — HYDROMORPHONE HYDROCHLORIDE 2 MG: 2 TABLET ORAL at 00:00

## 2022-01-19 RX ADMIN — ROPINIROLE 6 MG: 6 TABLET, FILM COATED, EXTENDED RELEASE ORAL at 18:05

## 2022-01-19 RX ADMIN — GABAPENTIN 200 MG: 100 CAPSULE ORAL at 12:24

## 2022-01-19 RX ADMIN — GABAPENTIN 200 MG: 100 CAPSULE ORAL at 09:19

## 2022-01-19 RX ADMIN — SENNOSIDES AND DOCUSATE SODIUM 1 TABLET: 50; 8.6 TABLET ORAL at 09:21

## 2022-01-19 RX ADMIN — POLYETHYLENE GLYCOL 3350 17 G: 17 POWDER, FOR SOLUTION ORAL at 09:21

## 2022-01-19 RX ADMIN — SENNOSIDES AND DOCUSATE SODIUM 1 TABLET: 50; 8.6 TABLET ORAL at 21:06

## 2022-01-19 RX ADMIN — HYDROCHLOROTHIAZIDE 25 MG: 25 TABLET ORAL at 18:01

## 2022-01-19 RX ADMIN — HYDROMORPHONE HYDROCHLORIDE 4 MG: 2 TABLET ORAL at 09:26

## 2022-01-19 RX ADMIN — HYDROMORPHONE HYDROCHLORIDE 4 MG: 2 TABLET ORAL at 17:15

## 2022-01-19 RX ADMIN — CARBIDOPA AND LEVODOPA 1 TABLET: 25; 100 TABLET ORAL at 12:25

## 2022-01-19 RX ADMIN — SODIUM CHLORIDE 125 ML/HR: 9 INJECTION, SOLUTION INTRAVENOUS at 07:15

## 2022-01-19 RX ADMIN — ACETAMINOPHEN 1000 MG: 500 TABLET ORAL at 00:00

## 2022-01-19 RX ADMIN — BUDESONIDE 250 MCG: 0.25 INHALANT RESPIRATORY (INHALATION) at 21:17

## 2022-01-19 RX ADMIN — WATER 2 G: 1 INJECTION INTRAMUSCULAR; INTRAVENOUS; SUBCUTANEOUS at 02:00

## 2022-01-19 NOTE — PROGRESS NOTES
Problem: Mobility Impaired (Adult and Pediatric)  Goal: *Acute Goals and Plan of Care (Insert Text)  Description: FUNCTIONAL STATUS PRIOR TO ADMISSION: Patient was modified independent using a single point cane for functional mobility - but more recently using RW secondary to leg weakness and pain, however over past month using RW. Pt also reporting 4 falls over last month associated with vertigo - recently started Meclizine . HOME SUPPORT PRIOR TO ADMISSION: The patient lived with  - however, daughter reporting limited support from . Daughter and [id number] trying to help out. Physical Therapy Goals  Initiated 1/18/2022    1. Patient will move from supine to sit and sit to supine , scoot up and down, and roll side to side in bed with modified independence within 4 days. 2. Patient will perform sit to stand with modified independence within 4 days. 3. Patient will ambulate with modified independence for > 100 feet with the least restrictive device within 4 days. 4. Patient will ascend/descend 4 stairs with one handrail(s) with minimal assistance/contact guard assist within 4 days. 5. Patient will perform [facility] home exercise program per protocol with supervision/set-up within 4 days. Outcome: Progressing Towards Goal   PHYSICAL THERAPY TREATMENT  Patient: Michelle Pace (46 y.o. female)  Date: 1/19/2022  Diagnosis: Primary osteoarthritis of right hip [M16.11]  Primary localized osteoarthritis of right hip [M16.11] <principal problem not specified>  Procedure(s) (LRB):  RIGHT TOTAL HIP ARTHROPLASTY ANTERIOR APPROACH (Right) 1 Day Post-Op  Precautions: Fall,WBAT  Chart, physical therapy assessment, plan of care and goals were reviewed. ASSESSMENT  Patient continues with skilled PT services and is progressing towards goals. Patient limited by pain, gait instability, impaired balance and decreased activity tolerance.   Currently needing modA for bed mobility with HOB elevated and assist to move to EOB. Once feet are on the floor needs CGA-Alfredo to come to stand and is able to amb approx 15 feet x 2 with RW and cues for technique/sequence.  prefers that patient DC home if able. Discussed with patient and  that focus needs to be on household distance and stair training. Will likely continue to need assist with bed mobility at time of DC and  is comfortable with this. Will continue to follow and will try to address stairs this PM.     Other factors to consider for discharge: at risk for falls, parkinson's limits mobility, will need physical assist at DC         PLAN :  Patient continues to benefit from skilled intervention to address the above impairments. Continue treatment per established plan of care. to address goals. Recommendation for discharge: (in order for the patient to meet his/her long term goals)  Physical therapy at least 2 days/week in the home AND ensure assist and/or supervision for safety with all mobility      IF patient discharges home will need the following DME: patient owns DME required for discharge       SUBJECTIVE:   Patient stated I want to do whatever I can to get this leg right.     OBJECTIVE DATA SUMMARY:   Critical Behavior:  Neurologic State: Alert,Appropriate for age  Orientation Level: Oriented X4  Cognition: Appropriate decision making,Appropriate safety awareness,Follows commands  Safety/Judgement: Awareness of environment,Fall prevention,Decreased awareness of need for safety    Range of Motion:  AROM: Generally decreased, functional                         Functional Mobility Training:  Bed Mobility:     Supine to Sit: Moderate assistance; Additional time     Scooting:  Moderate assistance        Transfers:  Sit to Stand: Contact guard assistance;Assist x2  Stand to Sit: Contact guard assistance;Assist x2                             Balance:  Sitting: Intact  Standing: Impaired  Standing - Static: Good;Constant support (fair in unsupported)  Standing - Dynamic : Fair;Constant support  Ambulation/Gait Training:  Distance (ft): 15 Feet (ft) (x 2, rest break on commode)  Assistive Device: Gait belt;Walker, rolling  Ambulation - Level of Assistance: Contact guard assistance;Assist x2        Gait Abnormalities: Antalgic;Decreased step clearance; Step to gait  Right Side Weight Bearing: As tolerated     Base of Support: Center of gravity altered  Stance: Right decreased  Speed/Magdalene: Pace decreased (<100 feet/min); Slow  Step Length: Left shortened;Right shortened         Pain Rating:  Reports pain but does not rate    Activity Tolerance:   Fair and requires rest breaks    After treatment patient left in no apparent distress:   Sitting in chair, Call bell within reach, and Caregiver / family present    COMMUNICATION/COLLABORATION:   The patients plan of care was discussed with: Physical therapist, Occupational therapist, and Registered nurse.      Mireya Reyes PT, DPT   Time Calculation: 35 mins

## 2022-01-19 NOTE — ROUTINE PROCESS
Bedside shift change report given to [facility] ,RN (oncoming nurse) by Tao Joya RN(offgoing nurse). Report given with SBAR.

## 2022-01-19 NOTE — PROGRESS NOTES
AYESHA: The patient plans to discharge home with [facility] home health and  to transport her home when stable for discharge. RUR: N/A    1. The patient is from home and lives with . 2. Orthopedics, PT/OT following. 3. The patient plans to discharge home with home health. Medicare Outpatient Observation Notice (MOON) provided to patient/representative with verbal explanation of the notice. Time allotted for questions regarding the notice. Patient /representative provided a completed copy of the MOON notice. Copy placed on bedside chart. Care Management Interventions  PCP Verified by CM: Yes  Palliative Care Criteria Met (RRAT>21 & CHF Dx)?: No  Mode of Transport at Discharge: Other (see comment)  Transition of Care Consult (CM Consult): [address]: No  Reason Outside [place]: Physician referred to specific agency  MyChart Signup: Yes  Discharge Durable Medical Equipment: Yes  Health Maintenance Reviewed: Yes  Physical Therapy Consult: Yes  Occupational Therapy Consult: Yes  Speech Therapy Consult: No  Support Systems: Spouse/Significant Other  Confirm Follow Up Transport: Family  The Plan for Transition of Care is Related to the Following Treatment Goals : The patient plans to discharge home with home health. The Patient and/or Patient Representative was Provided with a Choice of Provider and Agrees with the Discharge Plan?: Yes  Freedom of Choice List was Provided with Basic Dialogue that Supports the Patient's Individualized Plan of Care/Goals, Treatment Preferences and Shares the Quality Data Associated with the Providers?: Yes  [place] Resource Information Provided?: No  Discharge Location  Patient Expects to be Discharged to[de-identified] Home with home health     Reason for Admission:  Right Total Hip                     RUR Score:   N/A                  Plan for utilizing home health:      The Plan for Transition of Care is related to the following treatment goals:  Home Health    The Patient and/or patient representative [place] was provided with a choice of provider and agrees   with the discharge plan. [x] Yes [] No    Freedom of choice list was provided with basic dialogue that supports the patient's individualized plan of care/goals, treatment preferences and shares the quality data associated with the providers. [x] Yes [] No         PCP: First and Last name:  Denisse Bernardo MD     Name of Practice:    Are you a current patient: Yes/No: Yes   Approximate date of last visit: Jan, 2022   Can you participate in a virtual visit with your PCP: Yes                    Current Advanced Directive/Advance Care Plan: Full Code      Healthcare Decision Maker:   Click here to complete [address] including selection of the Healthcare Decision Maker Relationship (ie \"Primary\")                             Transition of Care Plan:             CM met with the patient in room 565. The patient is alert and oriented x4. Confirmed demographics. Prior to surgery, the patient was independent, using a rolling walker at home. The patient has history of parkinson's and her  states he helps her with her needs at home and they have an aide they have if needed along with a daughter, Gualberto Linares and daughter in law Ponce Hallman who are able to help when needed. The patient plans to discharge home with [facility] home health bridge to home program. The patient's  plans to transport when stable for discharge. CM following for discharge needs.     Sosa Carrasco RN/CRM

## 2022-01-19 NOTE — PROGRESS NOTES
Problem: Self Care Deficits Care Plan (Adult)  Goal: *Acute Goals and Plan of Care (Insert Text)  Description: FUNCTIONAL STATUS PRIOR TO ADMISSION: Patient with history of Parkinson's Disease. She is typically modified independent using a cane, however has been using RW over past month due to debility and pain. Patient receives assist with dressing, getting out of bed, and bathing as needed. HOME SUPPORT: The patient lived with her . Patient spouse reports plan to have caregiver come to the home during the day. Occupational Therapy Goals  Initiated 1/19/2022   1. Patient will perform lower body dressing using AE and/or RW prn with minimal assistance within 7 days. 2. Patient will perform toilet transfers at modified independence level within 7 days. 3. Patient will perform all aspects of toileting at modified independence level within 7 days. 4. Patient will tolerate standing grooming (as per baseline) with supervision/set-up using RW prn within 7 days. Outcome: Progressing Towards Goal   OCCUPATIONAL THERAPY EVALUATION  Patient: Melissa Marshall (82 y.o. female)  Date: 1/19/2022  Primary Diagnosis: Primary osteoarthritis of right hip [M16.11]  Primary localized osteoarthritis of right hip [M16.11]  Procedure(s) (LRB):  RIGHT TOTAL HIP ARTHROPLASTY ANTERIOR APPROACH (Right) 1 Day Post-Op   Precautions: Fall,WBAT    ASSESSMENT  Based on the objective data described below, the patient presents with generalized weakness, decreased lower body reach, impaired functional mobility and balance, decreased activity tolerance, and post-operative pain s/p R ALF, now POD 1 with WBAT precautions. Of note, patient with history of Parkinson's Disease and associated deficits. Patient received resting in bed with  present in room, receptive to education on precautions, home safety, available AE / DME, compensatory ADL strategies, energy conservation, and fall prevention.  This date, patient demonstrating bed mobility at overall mod assist, then performing sit<>stand transfers and ambulating with CGA and RW. Patient benefits from up to min steadying assist during dynamic standing aspects of lower body dressing with up to mod assist needed for distal threading of undergarments and pants. Per patient and spouse, they are not interested in AE training at this time. Patient fatigues quickly, benefiting from significant increased time for all activity and mobility as well as intermittent rest breaks. At this time, [facility] with increased supervision/assist at discharge. If unavailable, may need rehab for best outcome. Current Level of Function Impacting Discharge (ADLs/self-care): min - mod A    Functional Outcome Measure: The patient scored 50/100 on the Barthel Index. Other factors to consider for discharge:      Patient will benefit from skilled therapy intervention to address the above noted impairments. PLAN :  Recommendations and Planned Interventions: self care training, functional mobility training, therapeutic exercise, balance training, therapeutic activities, endurance activities, patient education and home safety training    Frequency/Duration: Patient will be followed by occupational therapy 5 times a week to address goals. Recommendation for discharge: (in order for the patient to meet his/her long term goals)  To be determined: HH with increased supervision/assist; may benefit from rehab if assist is unavailable at home    This discharge recommendation:  Has not yet been discussed the attending provider and/or case management    IF patient discharges home will need the following DME: reports owning a reacher       SUBJECTIVE:   Patient stated If it's taking longer than 10 minutes, then I'll call him over to help in reference to ADL performance.     OBJECTIVE DATA SUMMARY:   HISTORY:   Past Medical History:   Diagnosis Date    Arthritis     Asthma     Cancer ([facility] [address] 2006    MELANOMA    Chronic pain     BACK    Constipation     Embolism - blood clot 2011    DVT    Factor 5 Leiden mutation, heterozygous ([facility] [address]     Gallbladder polyp     Gastro-oesophageal reflux disease with hiatal hernia     GERD (gastroesophageal reflux disease)     Hypertension     Liver disease     ELEVATED LIVER ENZYMES 2011    Melanoma ([facility] [address]     Osteoporosis     Parkinson's disease     Seizures (HCC)     FEBRILE SEIZURES AS A CHILD    Spinal stenosis of lumbar region     Steroid-induced avascular necrosis of hip (HCC)      Past Surgical History:   Procedure Laterality Date    HX APPENDECTOMY  1961    HX GI      COLONSCOPY/EGD    HX HIP REPLACEMENT Left 07/2011    HX ORTHOPAEDIC      left hip replacement    HX TONSILLECTOMY         Expanded or extensive additional review of patient history:     Home Situation  Home Environment: Private residence  # Steps to Enter: 3  Rails to Enter: Yes  Hand Rails : Bilateral ((wide))  Wheelchair Ramp: No  One/Two Story Residence: Two story, live on 1st floor  Lift Chair Available: No  Living Alone: No  Support Systems: Spouse/Significant Other  Patient Expects to be Discharged to[de-identified] House  Current DME Used/Available at Home: Cane, straight,Walker, rolling,Safety frame toliet,Shower chair,Wheelchair  Tub or Shower Type: Shower    Hand dominance: Right    EXAMINATION OF PERFORMANCE DEFICITS:  Cognitive/Behavioral Status:  Neurologic State: Alert; Appropriate for age  Orientation Level: Oriented X4  Cognition: Appropriate decision making; Appropriate safety awareness; Follows commands  Perception: Appears intact  Perseveration: Perseverates during conversation  Safety/Judgement: Awareness of environment; Fall prevention;Decreased awareness of need for safety    Skin:     Edema:     Hearing:       Vision/Perceptual:            Range of Motion:  AROM: Generally decreased, functional    Strength:  Strength: Generally decreased, functional    Coordination:  Coordination: Generally decreased, functional  Fine Motor Skills-Upper: Left Intact; Right Intact    Gross Motor Skills-Upper: Left Intact; Right Intact    Tone & Sensation:  Sensation: Intact    Balance:  Sitting: Intact  Standing: Impaired  Standing - Static: Good;Constant support (fair in unsupported)  Standing - Dynamic : Fair;Constant support    Functional Mobility and Transfers for ADLs:  Bed Mobility:  Supine to Sit: Moderate assistance; Additional time  Scooting: Moderate assistance    Transfers:  Sit to Stand: Contact guard assistance;Assist x2  Stand to Sit: Contact guard assistance;Assist x2  Bathroom Mobility: Contact guard assistance  Toilet Transfer : Minimum assistance; Adaptive equipment; Additional time (grab bar + RW)    ADL Assessment:  Patient recalled and demonstrated avoiding extreme planes of movement with Right LE during ADLs and functional mobility with verbal and visual cues. Feeding: Minimum assistance    Oral Facial Hygiene/Grooming: Contact guard assistance (CGA in standing)    Bathing: Minimum assistance; Moderate assistance (inferred 2/2 AROM, endurance)    Upper Body Dressing: Minimum assistance (primarily for bra)    Lower Body Dressing: Moderate assistance;Maximum assistance; Additional time    Toileting: Minimum assistance; Adaptive equipment; Additional time    ADL Intervention and task modifications:  Grooming  Grooming Assistance: Set-up  Brushing Teeth: Set-up    Upper Body Dressing Assistance  Dressing Assistance: Moderate assistance  Bra: Maximum assistance  Pullover Shirt: Minimum assistance    Lower Body Dressing Assistance  Dressing Assistance: Moderate assistance  Underpants: Moderate assistance; Compensatory technique training  Pants With Elastic Waist: Moderate assistance; Compensatory technique training  Position Performed: Seated in chair;Standing  Cues: Verbal cues provided;Visual cues provided;Physical assistance  Adaptive Equipment Used: Greta Parson (educated on technique using reacher)    Toileting  Toileting Assistance: Minimum assistance  Bladder Hygiene: Contact guard assistance  Clothing Management: Minimum assistance  Cues: Verbal cues provided;Visual cues provided  Adaptive Equipment: Grab bars; Walker    Cognitive Retraining  Safety/Judgement: Awareness of environment; Fall prevention;Decreased awareness of need for safety    Bathing: Patient instructed when bathing to not submerge wound in water, stand to shower or sponge bathe, cover wound with plastic and tape to ensure no water reaches bandage/wound without cues. Patient indicated understanding. Dressing joint: Patient instructed and demonstrated to don/doff Right LE first/last with verbal cues and physical assistance. Patient instructed and demonstrated to don all clothing while sitting prior to standing, doff all clothing to knees while standing, then sit to doff clothing off from knees to feet in order to facilitate fall prevention, pain management, and energy conservation with Moderate Assistance. Home safety: Patient instructed on home modifications and safety (raise height of ADL objects, appropriate height of chair surfaces, recliner safety, change of floor surfaces, clear pathways) to increase independence and fall prevention. Patient indicated understanding. Standing: Patient instructed and demonstrated to walk up to sink/counter top/surfaces, step into walker to increase safety of joint and fall prevention with Contact guard assistance and Minimum assistance. Instructed to apply concept of hip contraindications to ADLs within the home (no extreme reaching across body, square off while using objects, slide objects along surfaces). Patient instructed to increase amount of time standing, observe standing position during ADLs in order to increase even weight bearing through bilateral LEs in order to increase independence with ADLs.   Goal to be reached 30 days post - op, per orthopedic surgeon or per PT. Patient indicated understanding. Tub transfer: Patient instructed regarding when it is safe to begin transfer into tub (complete stairs with PT, advance exercises with PT high enough to clear tub height). Patient instructed to use the same technique as used with stairs when entering and exiting tub (\"up with the non-surgical, down with the surgical leg\"). Patient indicated understanding. Functional Measure:    Barthel Index:  Bathin  Bladder: 5  Bowels: 10  Groomin  Dressin  Feeding: 10  Mobility: 0  Stairs: 0  Toilet Use: 5  Transfer (Bed to Chair and Back): 10  Total: 50/100      The Barthel ADL Index: Guidelines  1. The index should be used as a record of what a patient does, not as a record of what a patient could do. 2. The main aim is to establish degree of independence from any help, physical or verbal, however minor and for whatever reason. 3. The need for supervision renders the patient not independent. 4. A patient's performance should be established using the best available evidence. Asking the patient, friends/relatives and nurses are the usual sources, but direct observation and common sense are also important. However direct testing is not needed. 5. Usually the patient's performance over the preceding 24-48 hours is important, but occasionally longer periods will be relevant. 6. Middle categories imply that the patient supplies over 50 per cent of the effort. 7. Use of aids to be independent is allowed. Score Interpretation (from [address] [facility] [address])    Independent   60-79 Minimally independent   40-59 Partially dependent   20-39 Very dependent   <20 Totally dependent     -Marvin Garcia., Barthel, D.W. (1965). Functional evaluation: the Barthel Index. [facility] ([address] (1997). The Barthel activities of daily living index: self-reporting versus actual performance in the old (> or = 75 years). Journal of [address] [place] [address] 457), 252-297. JIGNESH Anna, Simeon Saldaña., Chanel Gao., Adeline Pain. (1999). Measuring the change in disability after inpatient rehabilitation; comparison of the responsiveness of the Barthel Index and Functional [place] Measure. Journal of Neurology, Neurosurgery, and Psychiatry, 66(4), 659-187. RICHARD Quach, MARIA FERNANDA Mancilla, & Dorinda Almanza M.A. (2004) Assessment of post-stroke quality of life in cost-effectiveness studies: The usefulness of the Barthel Index and the EuroQoL-5D. Quality of Life Research, 15, 890-88         Occupational Therapy Evaluation Charge Determination   History Examination Decision-Making   LOW Complexity : Brief history review  LOW Complexity : 1-3 performance deficits relating to physical, cognitive , or psychosocial skils that result in activity limitations and / or participation restrictions  LOW Complexity : No comorbidities that affect functional and no verbal or physical assistance needed to complete eval tasks       Based on the above components, the patient evaluation is determined to be of the following complexity level: LOW   Pain Rating:  Patient reporting mild post-op hip pain. Activity Tolerance:   Fair and requires rest breaks    After treatment patient left in no apparent distress:    Sitting in chair, Call bell within reach, and Caregiver / family present    COMMUNICATION/EDUCATION:   The patients plan of care was discussed with: Physical therapist and Registered nurse. Home safety education was provided and the patient/caregiver indicated understanding., Patient/family have participated as able in goal setting and plan of care. , and Patient/family agree to work toward stated goals and plan of care.     Thank you for this referral.  Elva Zaman OT  Time Calculation: 50 mins

## 2022-01-19 NOTE — PROGRESS NOTES
Ortho Daily Progress Note    Date of Surgery:  2022      Patient: Mayank Abel   YOB: 1955  Age: 74 y.o. SUBJECTIVE:   1 Day Post-Op following RIGHT TOTAL HIP ARTHROPLASTY ANTERIOR APPROACH    The patient states moderate hip pain this morning, otherwise without complaint. The patient denies CP, SOB, N/V.     OBJECTIVE:     Vital Signs:    Temp (24hrs), Av.7 °F (36.5 °C), Min:97.3 °F (36.3 °C), Max:98 °F (36.7 °C)    Patient Vitals for the past 24 hrs:   BP Temp Pulse Resp SpO2 Height Weight   22 0247 (!) 141/75 98 °F (36.7 °C) (!) 101 16 97 % -- --   22 -- -- -- -- 95 % -- --   22 138/76 97.9 °F (36.6 °C) (!) 102 16 95 % -- --   22 1814 (!) 155/77 97.5 °F (36.4 °C) 100 18 95 % -- --   22 1655 (!) 138/55 97.3 °F (36.3 °C) 93 18 95 % -- --   22 1547 (!) 160/52 97.6 °F (36.4 °C) 80 16 94 % -- --   22 1526 114/67 97.3 °F (36.3 °C) 88 16 94 % -- --   22 1445 123/71 -- 86 16 92 % -- --   22 1430 135/72 97.7 °F (36.5 °C) 85 17 99 % -- --   22 1415 127/64 -- 80 16 98 % -- --   22 1410 131/69 -- 78 13 98 % -- --   22 1405 132/72 -- 79 19 99 % -- --   22 1355 (!) 106/57 -- 77 14 99 % -- --   22 1351 (!) 104/59 97.7 °F (36.5 °C) 76 18 98 % -- --   22 1045 (!) 150/91 98 °F (36.7 °C) 91 17 100 % 5' 1\" (1.549 m) 78.1 kg (172 lb 2.9 oz)           Physical Exam:  General: A&Ox3, NAD. Respiratory: Respirations are unlabored.   Abdomen: S/NT  Surgical site: C,D and I.  Musculoskeletal: Calves are S/NT, Luis dorsi/plantar flexion/EHL intact, distal pulses intact  Neurological:  Luis LE's NVI    Laboratory Values:             Recent Labs     22  0257   HGB 10.2*   CREA 1.05*   *     Lab Results   Component Value Date/Time    Sodium 142 2022 02:57 AM    Potassium 3.7 2022 02:57 AM    Chloride 110 (H) 2022 02:57 AM    CO2 28 2022 02:57 AM    Glucose 116 (H) 2022 02:57 AM    BUN 17 01/19/2022 02:57 AM    Creatinine 1.05 (H) 01/19/2022 02:57 AM    Calcium 8.3 (L) 01/19/2022 02:57 AM         PLAN:     S/P RIGHT TOTAL HIP ARTHROPLASTY ANTERIOR APPROACH  WBAT. Mobilize with PT/nursing until discharged. Hemodynamics Stable. Wound Dressing changes PRN. Post Operative Pain Dilaudid PO and IV. History of chronic pain on large amounts of narcotics pre op. DVT Prophylaxis High risk for DVT, had DVT after previous hip surgery. Xarelto 20 mg daily, SCD's, encourage bed exercises, mobilize. Discharge Disposition Plan on home today/tomorrow with HH/PT if able to mobilize adequately. Follow up in Dr Tanya Flynn office in 3-4 weeks for post op care.        Signed By:     Jayce Marks PA-C  Physician Assistant  [id number]  Mobile [phone]  Office [phone]     January 19, 2022 7:37 AM

## 2022-01-19 NOTE — PROGRESS NOTES
Bedside and Verbal shift change report given to [place] (oncoming nurse) by Shikha Perez (offgoing nurse). Report included the following information SBAR, Kardex and MAR.

## 2022-01-20 VITALS
DIASTOLIC BLOOD PRESSURE: 71 MMHG | HEIGHT: 61 IN | OXYGEN SATURATION: 97 % | RESPIRATION RATE: 15 BRPM | SYSTOLIC BLOOD PRESSURE: 131 MMHG | WEIGHT: 172.18 LBS | HEART RATE: 101 BPM | TEMPERATURE: 98.4 F | BODY MASS INDEX: 32.51 KG/M2

## 2022-01-20 PROCEDURE — 97535 SELF CARE MNGMENT TRAINING: CPT

## 2022-01-20 PROCEDURE — 74011000250 HC RX REV CODE- 250: Performed by: PHYSICIAN ASSISTANT

## 2022-01-20 PROCEDURE — 97530 THERAPEUTIC ACTIVITIES: CPT

## 2022-01-20 PROCEDURE — 74011250636 HC RX REV CODE- 250/636: Performed by: PHYSICIAN ASSISTANT

## 2022-01-20 PROCEDURE — 74011250637 HC RX REV CODE- 250/637: Performed by: PHYSICIAN ASSISTANT

## 2022-01-20 PROCEDURE — 97110 THERAPEUTIC EXERCISES: CPT

## 2022-01-20 PROCEDURE — 94640 AIRWAY INHALATION TREATMENT: CPT

## 2022-01-20 PROCEDURE — 97116 GAIT TRAINING THERAPY: CPT

## 2022-01-20 RX ADMIN — RIVAROXABAN 20 MG: 20 TABLET, FILM COATED ORAL at 07:40

## 2022-01-20 RX ADMIN — CARBIDOPA AND LEVODOPA 1 TABLET: 25; 100 TABLET ORAL at 09:10

## 2022-01-20 RX ADMIN — SENNOSIDES AND DOCUSATE SODIUM 1 TABLET: 50; 8.6 TABLET ORAL at 09:10

## 2022-01-20 RX ADMIN — HYDROMORPHONE HYDROCHLORIDE 4 MG: 2 TABLET ORAL at 04:43

## 2022-01-20 RX ADMIN — PANTOPRAZOLE SODIUM 40 MG: 40 TABLET, DELAYED RELEASE ORAL at 07:40

## 2022-01-20 RX ADMIN — HYDROMORPHONE HYDROCHLORIDE 4 MG: 2 TABLET ORAL at 13:34

## 2022-01-20 RX ADMIN — ACETAMINOPHEN 1000 MG: 500 TABLET ORAL at 12:58

## 2022-01-20 RX ADMIN — MECLIZINE 25 MG: 12.5 TABLET ORAL at 04:49

## 2022-01-20 RX ADMIN — ACETAMINOPHEN 1000 MG: 500 TABLET ORAL at 04:43

## 2022-01-20 RX ADMIN — BUDESONIDE 250 MCG: 0.25 INHALANT RESPIRATORY (INHALATION) at 07:58

## 2022-01-20 RX ADMIN — HYDROMORPHONE HYDROCHLORIDE 4 MG: 2 TABLET ORAL at 09:10

## 2022-01-20 RX ADMIN — GABAPENTIN 200 MG: 100 CAPSULE ORAL at 09:10

## 2022-01-20 RX ADMIN — CARBIDOPA AND LEVODOPA 1 TABLET: 25; 100 TABLET ORAL at 12:58

## 2022-01-20 RX ADMIN — POLYETHYLENE GLYCOL 3350 17 G: 17 POWDER, FOR SOLUTION ORAL at 09:10

## 2022-01-20 RX ADMIN — GABAPENTIN 200 MG: 100 CAPSULE ORAL at 12:58

## 2022-01-20 NOTE — PROGRESS NOTES
Problem: Self Care Deficits Care Plan (Adult)  Goal: *Acute Goals and Plan of Care (Insert Text)  Description: FUNCTIONAL STATUS PRIOR TO ADMISSION: Patient with history of Parkinson's Disease. She is typically modified independent using a cane, however has been using RW over past month due to debility and pain. Patient receives assist with dressing, getting out of bed, and bathing as needed. HOME SUPPORT: The patient lived with her . Patient spouse reports plan to have caregiver come to the home during the day. Occupational Therapy Goals  Initiated 1/19/2022   1. Patient will perform lower body dressing using AE and/or RW prn with minimal assistance within 7 days. 2. Patient will perform toilet transfers at modified independence level within 7 days. 3. Patient will perform all aspects of toileting at modified independence level within 7 days. 4. Patient will tolerate standing grooming (as per baseline) with supervision/set-up using RW prn within 7 days. Outcome: Progressing Towards Goal    OCCUPATIONAL THERAPY TREATMENT  Patient: Darwin Fontanez (25 y.o. female)  Date: 1/20/2022  Diagnosis: Primary osteoarthritis of right hip [M16.11]  Primary localized osteoarthritis of right hip [M16.11] <principal problem not specified>  Procedure(s) (LRB):  RIGHT TOTAL HIP ARTHROPLASTY ANTERIOR APPROACH (Right) 2 Days Post-Op  Precautions: Fall,WBAT  Chart, occupational therapy assessment, plan of care, and goals were reviewed. ASSESSMENT  Patient continues with skilled OT services and is progressing towards goals. Pt noted with progressive grooming independence, functionally evidenced by completing standing at RW with SBA and no LOB noted or c/o pain, rather RLE stiffness. Pt also progressing with distal LE dressing independence, donning socks, at EOB with S/U and Supervision, benefiting from cues for AE technique.   Pt outfitted with long-handled AE kit, with education provided regarding different tools; good understanding verbalized. Pt safe for discharge home from a self-care standpoint, with pt continuing to benefit from skilled OT during acute hospitalization, with reporting therapist believing pt would benefit from [facility] and ADL/IADL Supervision upon discharge. Current Level of Function Impacting Discharge (ADLs): Min A    Other factors to consider for discharge: slow, festinating gait (baseline)         PLAN :  Patient continues to benefit from skilled intervention to address the above impairments. Continue treatment per established plan of care to address goals. Recommend with staff: OOB meals, Active ADL engagement, Assist x1 to/from bathroom at     Recommend next OT session: POC progression    Recommendation for discharge: (in order for the patient to meet his/her long term goals)  Occupational therapy at least 2 days/week in the home AND ensure assist and/or supervision for safety with ADLs/IADLs    This discharge recommendation:  Has been made in collaboration with the attending provider and/or case management    IF patient discharges home will need the following DME: patient owns DME required for discharge       SUBJECTIVE:   Patient stated Rufino Martinez for your professionalism, you're good at your job.     OBJECTIVE DATA SUMMARY:   Cognitive/Behavioral Status:  Neurologic State: Alert  Orientation Level: Oriented X4  Cognition: Appropriate decision making; Appropriate for age attention/concentration; Appropriate safety awareness  Perception: Appears intact  Perseveration: No perseveration noted  Safety/Judgement: Awareness of environment    Functional Mobility and Transfers for ADLs:  Bed Mobility:  Supine to Sit: Minimum assistance  Sit to Supine: Minimum assistance  Scooting: Minimum assistance    Transfers:  Sit to Stand: Contact guard assistance; Additional time  Functional Transfers  Bathroom Mobility: Stand-by assistance       Balance:  Sitting: Intact  Standing: Impaired; Without support  Standing - Static: Constant support;Good  Standing - Dynamic : Constant support;Good;Fair    ADL Intervention:  Grooming  Grooming Assistance: Stand-by assistance  Position Performed: Standing (RW)  Washing Hands: Stand-by assistance  Brushing Teeth: Stand-by assistance    Lower Body Dressing Assistance  Socks: Set-up; Supervision  Position Performed: Seated edge of bed  Cues:  (for AE technique)  Adaptive Equipment Used: Reacher;Sock aid    Cognitive Retraining  Safety/Judgement: Awareness of environment    Pain:  C/o R hip stiffness, with minor pain; RN aware and following    Activity Tolerance:   Fair and requires rest breaks    After treatment patient left in no apparent distress:   Sitting in chair, Call bell within reach, and Caregiver / family present    COMMUNICATION/COLLABORATION:   The patients plan of care was discussed with: Physical therapist, Physical therapy assistant, Registered nurse, and Case management.      Padilla Godoy OT  Time Calculation: 41 mins

## 2022-01-20 NOTE — PROGRESS NOTES
Ortho Daily Progress Note    Date of Surgery:  2022      Patient: Aundrea Baldwin   YOB: 1955  Age: 74 y.o. SUBJECTIVE:   2 Days Post-Op following RIGHT TOTAL HIP ARTHROPLASTY ANTERIOR APPROACH    The patient states moderate hip pain this morning, otherwise without complaint. Slow gradual gains mobilizing with PT. The patient denies CP, SOB, N/V.     OBJECTIVE:     Vital Signs:    Temp (24hrs), Av.7 °F (37.1 °C), Min:98 °F (36.7 °C), Max:99.8 °F (37.7 °C)    Patient Vitals for the past 24 hrs:   BP Temp Pulse Resp SpO2   22 0256 (!) 149/78 98 °F (36.7 °C) (!) 102 16 95 %   22 2120 -- -- -- -- 96 %   22 2104 123/64 98.3 °F (36.8 °C) (!) 113 16 96 %   22 0942 -- -- -- -- 96 %   22 0803 138/77 99.8 °F (37.7 °C) (!) 112 18 94 %           Physical Exam:  General: A&Ox3, NAD. Respiratory: Respirations are unlabored. Abdomen: S/NT  Surgical site: C,D and I.  Musculoskeletal: Calves are S/NT, Luis dorsi/plantar flexion/EHL intact, distal pulses intact  Neurological:  Luis LE's NVI    Laboratory Values:             Recent Labs     22   HGB 10.2*   CREA 1.05*   *     Lab Results   Component Value Date/Time    Sodium 142 2022 02:57 AM    Potassium 3.7 2022 02:57 AM    Chloride 110 (H) 2022 02:57 AM    CO2 28 2022 02:57 AM    Glucose 116 (H) 2022 02:57 AM    BUN 17 2022 02:57 AM    Creatinine 1.05 (H) 2022 02:57 AM    Calcium 8.3 (L) 2022 02:57 AM         PLAN:     S/P RIGHT TOTAL HIP ARTHROPLASTY ANTERIOR APPROACH WBAT. Mobilize with PT/nursing until discharged. Hemodynamics Stable. Wound Dressing changes PRN. Post Operative Pain Dilaudid, Tylenol. DVT Prophylaxis Xarelto 20 mg every day per hematology, high risk for DVT, SCD's, encourage bed exercises, mobilize. Discharge Disposition Plan on home today with HH/PT.  Follow up in Dr Dirk Parra office in 3-4 weeks for post op care.       Signed By:     Fly Sanchez PA-C  Physician Assistant  [id number] [facility] [phone]  Office [phone]     January 20, 2022 7:45 AM

## 2022-01-20 NOTE — PROGRESS NOTES
Spiritual Care Partner Volunteer visited patient in [address] on 1.20.22.     Documented by Jacky Sheppard, Staff , on 1.20.22  Please call TEX [phone] to page  if needed

## 2022-01-20 NOTE — DISCHARGE INSTRUCTIONS
Discharge Instructions Hip Replacement  Dr. Michell mSallwood      Patient Name  Darwin Fontanez  Date of procedure  1/18/2022    Procedure  Procedure(s):  RIGHT TOTAL HIP ARTHROPLASTY ANTERIOR APPROACH  Surgeon  Surgeon(s) and Role:     Ladon Schlatter, MD - Primary  Date of discharge: 1/20/2022  PCP: Sudarshan Callaway MD    Follow up care   Follow up visit with Dr. Michell Smallwood in 3-4 weeks. Call [phone]  to make an appointment    Activity at home   AVOID sudden and extreme movement of your hip (surgical leg)   Take a short walk every hour; except at night when sleeping   Do your Home Exercise Program 3 times every day    After exercising lie down and elevate your leg on pillows for 15-30 minutes to decrease swelling   Refer to your patient notebook for more information    Bathing and caring for your incision   You may take a shower with your waterproof dressing on your hip.  The waterproof dressing is to stay on your hip for 7 days.  On the 7th day have someone gently peel the dressing off by lifting the edge and stretching it to break the seal.   You may then leave your incision open to air unless you see drainage from your hip.  If you have staple closing your incision they will be removed by home health 14 days after your surgery. Preventing blood clots   Take Xarelto 20 mg every day as prescribed.  Call Dr. Michell Smallwood if you have side effects of blood thinning medication: bleeding, bruising, upset stomach, or diarrhea.  Call Dr. Michell Smallwood for signs of a blood clot in your leg: calf pain, tenderness, redness, swelling of lower leg    Preventing lung congestion   Use your incentive spirometer 4 times a day; do 10 repetitions each time   Remember to keep the small blue ball between the two arrows when taking a slow, deep breath           Pain Management   Get up and walk a short distance to relieve pain and stiffness.  Place ice wrap on your hip except when you are walking.  The gel ice packs should be changed about every 4 hours   Elevate your leg on pillows for 15-30 minutes    Take Tylenol 650mg (take two 325mg tablets) every 6 hours for pain.  If needed, take a narcotic pain pill every 4-6 hours as prescribed.  Take all medications with a small amount of food.  As your pain decreases, take the narcotics less often or take ½ of a pill   Call Dr. Haile Del Valle if you have side effects from your narcotic pain medication: itching, drowsiness, dizziness, upset stomach, dry mouth, constipation or if you medication is not relieving your pain. Diet after surgery   You may resume your normal diet. Include vegetables, fruit, whole grains, lean meats, and low-fat dairy products. Eat food high in fiber    Drink plenty of fluids, including 8 cups of water daily   Take Senokot-s twice a day to prevent constipation    Avoid after surgery   Do not take any over-the-counter medication for pain except Tylenol   Do not take more than 3000mg (3 grams) of Tylenol in 24 hours.  Do not drink alcoholic beverages   Do not smoke   Do not drive until seen for follow up appointment   Do not place frozen gel pack directly on your skin. It can cause frostbite.  Do not take a tub bath, swim or get in a hot tub for 6 weeks  Prevention of falls and safety at home   Set up an area where you can rest comfortably leaving space around furniture to allow you to walk with your walker   Keep stairs, hallways and bathrooms well lit; especially at night   Arrange for care for your pets   Keep your home free of clutter        Call Dr. Halie Del Valle at [phone] for:   Pain that is not relieved by pain medication, ice and activity   Side effects of medications   Increased/spread of bruising   Warning signs of infection:  ? persistent fever greater than 100 degrees  ?  shaking or chills  ? increased redness, tenderness, swelling or drainage from incision  ? increased pain during activity or rest   Warning signs of a blood clot in your leg:  ? increased pain in your calf  ? tenderness or redness  ? increased swelling or knee, calf, ankle or foot    Call [phone] after 5pm or on a weekend.  The on call physician will return your phone call      Call your Primary Care Doctor for:    Concerns about your medical conditions such as diabetes, high blood pressure, asthma, congestive heart failure   Blood sugars greater than 180   Persistent headache or dizziness   Coughing or congestion   Constipation or diarrhea   Burning when you go to the bathroom   Abnormal heart rate (fast or slow)      Call 911 and go to the nearest hospital for:    Sudden increased shortness of breath   Sudden onset of chest pain   Difficulty breathing   Localized chest pain with coughing or taking a deep breath

## 2022-01-20 NOTE — NURSE NAVIGATOR
[address]  [facility] Orthopaedic Pathway Handoff     FROM:                                TO: [facility]                                                      ([address] [place] [address] or Facility name)  Antony Maurice [address]  [address] [place] [address]  Dept: [address]: [phone]                                      Room#:  565/01                                                       Nurse Navigator:  Aida Perez RN         SITUATION      ASAScore: ASA 3 - Patient with moderate systemic disease with functional limitations    Admitted:  1/18/2022  Hospital Day: 3      Attending Provider:  Kehinde Hinkle MD     Consultations:  None    PCP:  Violet Shankar MD   [phone]     Admitting Dx:  Primary osteoarthritis of right hip [M16.11]  Primary localized osteoarthritis of right hip [M16.11]       Active Problems:    Primary localized osteoarthritis of right hip (1/18/2022)      2 Days Post-Op of   Procedure(s):  RIGHT TOTAL HIP ARTHROPLASTY ANTERIOR APPROACH   BY: Kehinde Hinkle MD             ON: 1/18/2022                  Code Status: Full Code             Advance Directive? No Doesnt Have (Send w/patient)     Isolation:  There are currently no Active Isolations       MDRO: No current active infections    BACKGROUND     Allergies:   Allergies   Allergen Reactions    Ciprofloxacin Rash    Oxycontin [Oxycodone] Shortness of Breath and Itching    Sulfa (Sulfonamide Antibiotics) Rash    Zocor [Simvastatin] Myalgia       Past Medical History:   Diagnosis Date    Arthritis     Asthma     [facility] 2006    MELANOMA    Chronic pain     BACK    Constipation     Embolism - blood clot 2011    DVT    Factor 5 Leiden mutation, [facility] ([facility]     Gallbladder polyp     Gastro-oesophageal reflux disease with hiatal hernia     GERD (gastroesophageal reflux disease)     Hypertension     Liver disease     ELEVATED LIVER ENZYMES 2011    Melanoma ([facility]     Osteoporosis     Parkinson's disease     Seizures ([facility]     FEBRILE SEIZURES AS A CHILD    Spinal stenosis of lumbar region     Steroid-induced avascular necrosis of hip (HCC)        Past Surgical History:   Procedure Laterality Date    HX APPENDECTOMY      HX GI      COLONSCOPY/EGD    HX HIP REPLACEMENT Left 2011    HX ORTHOPAEDIC      left hip replacement    HX TONSILLECTOMY         Prior to Admission Medications   Prescriptions Last Dose Informant Patient Reported? Taking? CALCIUM PO 2022 at Unknown time  Yes Yes   Sig: Take 50,000 Units by mouth every seven (7) days. THURSDAY   HYDROmorphone (DILAUDID) 4 mg tablet 2022 at 0300  Yes Yes   Sig: Take 4 mg by mouth every four (4) hours as needed for Pain. TAKES 5X DAY ROUTINELY   Menthol (Stopain) 8 % spry   Yes No   Sig: by Apply Externally route as needed. ROLL ON   Nebulizer & Compressor machine   No No   Si Each by Does Not Apply route four (4) times daily as needed. OTHER   Yes No   Sig: as needed. CANNABIS HEMP HOT CREAM   SODIUM CHLORIDE/SODIUM BICARB (NEILMED SINUS RINSE COMPLETE NA)   Yes No   Sig: by Nasal route. amantadine HCL (SYMMETREL) 100 mg capsule 2022 at Unknown time  Yes Yes   Sig: Take 100 mg by mouth every evening. atorvastatin (LIPITOR) 10 mg tablet 2022 at Unknown time  Yes Yes   Sig: Take  by mouth every evening. budesonide (PULMICORT) 180 mcg/Inhalation AePB inhaler 2022 at Unknown time  Yes Yes   Sig: Take 2 Puffs by inhalation two (2) times a day. carbidopa-levodopa (SINEMET)  mg per tablet 2022 at Unknown time  Yes Yes   Sig: Take 1 Tab by mouth four (4) times daily. ergocalciferol (VITAMIN D) 50,000 unit capsule 2022 at Unknown time  Yes Yes   Sig: Take 50,000 Units by mouth every seven (7) days. gabapentin (NEURONTIN) 100 mg capsule 2022 at Unknown time  Yes Yes   Sig: Take 200 mg by mouth four (4) times daily.    hydrochlorothiazide (HYDRODIURIL) 25 mg tablet 1/16/2022  Yes No   Sig: Take 25 mg by mouth every evening. lansoprazole (PREVACID SOLUTAB) 30 mg disintegrating tablet 1/18/2022 at Unknown time  Yes Yes   Sig: Take  by mouth Every morning. levalbuterol (XOPENEX) 0.63 mg/3 mL nebu 1/18/2022 at Unknown time  No Yes   Sig: 3 mL by Nebulization route every six (6) hours as needed. meclizine (ANTIVERT) 25 mg tablet 1/18/2022 at Unknown time  Yes Yes   Sig: Take 25 mg by mouth three (3) times daily as needed for Dizziness. montelukast (SINGULAIR) 10 mg tablet 1/17/2022 at Unknown time  Yes Yes   Sig: Take 10 mg by mouth every evening. polyethylene glycol (MIRALAX) 17 gram packet   Yes No   Sig: Take 17 g by mouth as needed. polyethylene glycol 400 (Blink Gel Tears) 0.25 % drpg   Yes No   Sig: Apply  to eye as needed. rOPINIRole (REQUIP XL) 6 mg Tb24 1/17/2022 at Unknown time  Yes Yes   Sig: Take  by mouth every evening. rasagiline (AZILECT) 1 mg tablet 1/17/2022 at Unknown time  No Yes   Sig: Take 1 Tab by mouth daily. Patient taking differently: Take 1 mg by mouth every evening. turmeric root extract 500 mg cap 1/11/2022 at Unknown time  Yes Yes   Sig: Take  by mouth. Facility-Administered Medications: None       Vaccinations: There is no immunization history on file for this patient. ASSESSMENT   Age: 74 y.o.              Gender: female        Height: Height: 5' 1\" (154.9 cm)                    Weight:Weight: 78.1 kg (172 lb 2.9 oz)     Patient Vitals for the past 8 hrs:   Temp Pulse Resp BP SpO2   01/20/22 0910 98.4 °F (36.9 °C) (!) 101 15 131/71 97 %   01/20/22 0800 -- -- -- -- 97 %            Active Orders   Diet    ADULT DIET Regular       Orientation: Orientation Level: Oriented X4    Active Lines/Drains:  (Peg Tube / Vanegas / CL or S/L?):no    Urinary Status: Voiding      Last BM: Last Bowel Movement Date: 01/17/22     Skin Integrity: Incision (comment)             Mobility: Slightly limited   Weight Bearing Status: WBAT (Weight Bearing as Tolerated)      Gait Training  Assistive Device: Gait belt,Walker, rolling  Ambulation - Level of Assistance: Contact guard assistance,Additional time  Distance (ft): 50 Feet (ft)  Stairs - Level of Assistance: Contact guard assistance,Assist X2  Number of Stairs Trained: 4  Rail Use: Right  (lateral approach)     On Anticoagulation? YES  Xarelto                                         Pain Medications given:  Dilaudid                                   Lab Results   Component Value Date/Time    Glucose 116 (H) 01/19/2022 02:57 AM    Hemoglobin A1c 5.4 01/10/2022 03:47 PM    INR 1.1 01/10/2022 03:47 PM    INR 1.1 09/06/2017 06:28 AM    HGB 10.2 (L) 01/19/2022 02:57 AM    HGB 12.7 01/10/2022 03:47 PM    HGB 12.8 11/06/2020 03:52 PM    HGB 13.4 09/06/2017 06:28 AM       Readmission Risks:  Score:         RECOMMENDATION     See After Visit Summary (AVS) for:  · Discharge instructions  · After [address]   · Medication Reconciliation          [facility] Orthopaedic Nurse Navigator  LEX Sanches, RN-BC       Office  [phone]  Cell      [phone]  Fax      [phone]  [email or web address]             . Tiffani

## 2022-01-20 NOTE — ROUTINE PROCESS
Bedside shift change report given to Rebecca Benedict RN (oncoming nurse) by Marlin Luu RN(offgoing nurse). Report given with SBAR.

## 2022-01-20 NOTE — PROGRESS NOTES
Problem: Mobility Impaired (Adult and Pediatric)  Goal: *Acute Goals and Plan of Care (Insert Text)  Description: FUNCTIONAL STATUS PRIOR TO ADMISSION: Patient was modified independent using a single point cane for functional mobility - but more recently using RW secondary to leg weakness and pain, however over past month using RW. Pt also reporting 4 falls over last month associated with vertigo - recently started Meclizine . HOME SUPPORT PRIOR TO ADMISSION: The patient lived with  - however, daughter reporting limited support from . Daughter and [id number] trying to help out. Physical Therapy Goals  Initiated 1/18/2022    1. Patient will move from supine to sit and sit to supine , scoot up and down, and roll side to side in bed with modified independence within 4 days. 2. Patient will perform sit to stand with modified independence within 4 days. 3. Patient will ambulate with modified independence for > 100 feet with the least restrictive device within 4 days. 4. Patient will ascend/descend 4 stairs with one handrail(s) with minimal assistance/contact guard assist within 4 days. 5. Patient will perform THR home exercise program per protocol with supervision/set-up within 4 days. Outcome: Progressing Towards Goal    PHYSICAL THERAPY TREATMENT  Patient: Jayla Gupta (70 y.o. female)  Date: 1/20/2022  Diagnosis: Primary osteoarthritis of right hip [M16.11]  Primary localized osteoarthritis of right hip [M16.11] <principal problem not specified>  Procedure(s) (LRB):  RIGHT TOTAL HIP ARTHROPLASTY ANTERIOR APPROACH (Right) 2 Days Post-Op  Precautions: Fall,WBAT  Chart, physical therapy assessment, plan of care and goals were reviewed. ASSESSMENT  Patient continues with skilled PT services and is progressing towards goals. Pt received seated in chair finishing up with OT, and willing to work with therapy.  Pt continues to bed limited with RLE pain with hip flexion and activity, decreased strength and independence with this session. Pt able to tolerate bed SPT chair to bed with RW, CGA. Pt continued with bed mobility sit<>sup with VC for gait belt for support with min A. Pt continued with LE ex with VC for form with more attention for heel slides needing AAROM due to weakness. Pt continued with gait training to hallway and back to chair ~50' with VC for increased RLE step length and sequencing for decreased pain with amb. Pt completed session reporting feeling comfortable with stair training from last session, pt left in chair with call bell within reach and all needs met at the time. Rn notified pt cleared for d/c with HHPT. Current Level of Function Impacting Discharge (mobility/balance): min A  bed mobility with gait belt for support, CGA for all other mobility, amb up to 50' with RW. Other factors to consider for discharge: stiffness, continue with HEP to increase strength?, fall risk         PLAN :  Patient continues to benefit from skilled intervention to address the above impairments. Continue treatment per established plan of care. to address goals. Recommendation for discharge: (in order for the patient to meet his/her long term goals)  Physical therapy at least 2 days/week in the home     This discharge recommendation:  Has not yet been discussed the attending provider and/or case management    IF patient discharges home will need the following DME: patient owns DME required for discharge       SUBJECTIVE:   Patient stated This is much ealier but, this bed is horrible. Gauri Shaver    OBJECTIVE DATA SUMMARY:   Critical Behavior:  Neurologic State: Alert  Orientation Level: Oriented X4  Cognition: Appropriate decision making,Appropriate for age attention/concentration,Appropriate safety awareness,Follows commands  Safety/Judgement: Awareness of environment,Fall prevention,Decreased awareness of need for safety  Functional Mobility Training:  Bed Mobility:  Supine to Sit: Minimum assistance  Sit to Supine: Minimum assistance  Scooting: Minimum assistance    Transfers:  Sit to Stand: Contact guard assistance; Additional time  Stand to Sit: Contact guard assistance; Additional time    Balance:  Sitting: Intact  Standing: Impaired; Without support  Standing - Static: Constant support;Good  Standing - Dynamic : Constant support;Good;Fair    Ambulation/Gait Training:  Distance (ft): 50 Feet (ft)  Assistive Device: Gait belt;Walker, rolling  Ambulation - Level of Assistance: Contact guard assistance; Additional time  Gait Abnormalities: Antalgic;Decreased step clearance  Right Side Weight Bearing: As tolerated  Stance: Right decreased  Speed/Magdalene: Pace decreased (<100 feet/min)  Step Length: Left shortened;Right shortened    Therapeutic Exercises:   Supine LE ex - AP, glute sets, heel press, heel slides (AAROM) x10 ea    Pain Rating:  Pain reported with activity    Activity Tolerance:   Good    After treatment patient left in no apparent distress:   Sitting in chair, Call bell within reach, and Caregiver / family present    COMMUNICATION/COLLABORATION:   The patients plan of care was discussed with: Registered nurse.      Carter Siddiqui PTA   Time Calculation: 40 mins

## 2022-01-25 ENCOUNTER — TELEPHONE (OUTPATIENT)
Dept: ORTHOPEDIC SURGERY | Age: 67
End: 2022-01-25

## 2022-01-31 NOTE — OP NOTES
OPERATIVE REPORT  RIGHT TOTAL HIP REPLACEMENT (ANTERIOR APPROACH)    NAME: Mary Marcus  MRN: [id number]  :  1955  AGE: 74 y.o. DATE OF SURGERY:  2022    PREOPERATIVE DIAGNOSIS: Severe degenerative joint disease, right hip. POSTOPERATIVE DIAGNOSIS: Severe degenerative joint disease, right hip. PROCEDURES PERFORMED: Right total hip replacement - Anterior approach    SURGEON: Kari Pollard MD.    ASSISTANT: Coy Blanchard PA-C    ANESTHESIA: General    ESTIMATED BLOOD LOSS: 200 mL. DRAINS: None. COMPLICATIONS: None. SPECIMENS REMOVED: None. PRE-OP ANTIBIOTIC: Ancef 2 gram    IMPLANT:   Implant Name Type Inv.  Item Serial No.  Lot No. LRB No. Used Action   ELIMINATOR H APEX FOR 48-60MM PINN HIP SHELL - SNA  ELIMINATOR H APEX FOR 48-60MM PINN HIP SHELL NA [facility] [id number] Right 1 Implanted   CUP ACET [id number] HIP GRIPTION MITZY CEMENTLESS FIX SECT SER - SNA  CUP ACET [id number] HIP GRIPTION MITZY CEMENTLESS FIX SECT SER NA [facility] [id number] Right 1 Implanted   SCREW BNE L40MM DIA6.5MM CANC HIP DOME PINN - SNA  SCREW BNE L40MM DIA6.5MM CANC HIP DOME PINN NA [facility] [id number] Right 1 Implanted   SCREW BNE L40MM DIA6.5MM CANC HIP DOME PINN - SNA  SCREW BNE L40MM DIA6.5MM CANC HIP DOME PINN NA [facility] [id number] Right 1 Implanted   LINER ACET OD52MM ID36MM HIP ALTRX PINN - SNA  LINER ACET OD52MM ID36MM HIP ALTRX PINN NA [facility] [id number] Right 1 Implanted   STEM FEM SZ 2 STD OFFSET HIP CLLRD ARTC EZ 12/14 TAPR ACTIS - SNA  STEM FEM SZ 2 STD OFFSET HIP CLLRD ARTC EZ /14 TAPR ACTIS NA [facility] [id number] Right 1 Implanted   HEAD FEM [id number] +5MM OFFSET  TAPR HIP CERAMIC BIOLOX - SNA  HEAD FEM [id number] +5MM OFFSET 14 TAPR HIP CERAMIC BIOLOX NA [facility] [id number] Right 1 Implanted       INDICATIONS: 74 yrs female with severe DJD of the right hip. The patient's right hip has been progressive in terms of symptoms. The patient now has severe activity limitation. The patient has continued with conservative management without adequate relief or improvement of functional limitations. We discussed options and she wished to proceed with right total hip replacement. The patient has continued with conservative management without adequate relief or improvement of functional limitations. DESCRIPTION OF PROCEDURE: Anesthetic was initiated. Preoperative dose of IV Ancef was given. Vanegas catheter was not placed. The right side was confirmed as the operative side, prepped and draped in the usual sterile fashion. Skin was covered with Ioban occlusive dressing. Direct anterior exposure was made to the patient's hip through the sartorius tensor interval. Anterior hip vasculature was cauterized. Retractors were taken out to observe for bleeding and there was none. The capsule was identified, opened and T'd distally. The femoral neck was osteotomized. Femoral head was removed from the acetabulum, which was exposed and soft tissues were removed. The acetabulum was progressively reamed to 51 and a 52 trial shell was impacted with good press-fit. This was removed and a Depuy 52mm shell was impacted in the acetabulum in 40 degrees of abduction in an anatomic-type anteversion. Bone spurs were removed and 6.5 screws x2 were placed. The polyethylene liner was placed. Femur was positioned and elevated from the wound. The medullary canal was entered. Flexible reamers were not utilized as the patient did not have a narrowed femoral canal, broached to a size 2. Calcar planed and then trialed. A +5 hip ball was the most appropriate for leg length and tension with a standard offset stem. The hip was dislocated. The anterior greater trochanter was trimmed down to enhance flexion, rotation and stability.  The trial was removed and the real stem was impacted. The real hip ball was placed. The hip was reduced. After copious irrigation, the capsule was closed with #2 Vicryl sutures. I irrigated the skin, subcutaneous and deep wound. I closed the fascia of the tensor fascia yoandy with #2 Vicryl sutures. Skin and subcutaneous were irrigated. Soft tissues were infiltrated with local anesthetic. Skin and subcutaneous were closed in a standard fashion. Sterile dressing was applied. There were no complications. No specimen was sent. The procedure was a RIGHT TOTAL HIP REPLACEMENT using a Depuy total hip construct. The patient was transferred to the recovery room in stable condition. Salvatore Pérez PA-C was critical throughout the case to assist with positioning, retraction and closure. There were no other available residents or surgical assistants to assist during this procedure.     Andrew Scott MD negative...

## 2022-02-18 ENCOUNTER — OFFICE VISIT (OUTPATIENT)
Dept: ORTHOPEDIC SURGERY | Age: 67
End: 2022-02-18
Payer: MEDICARE

## 2022-02-18 VITALS — BODY MASS INDEX: 32.1 KG/M2 | WEIGHT: 170 LBS | HEIGHT: 61 IN

## 2022-02-18 DIAGNOSIS — Z96.641 STATUS POST TOTAL REPLACEMENT OF RIGHT HIP: Primary | ICD-10-CM

## 2022-02-18 PROCEDURE — 99024 POSTOP FOLLOW-UP VISIT: CPT | Performed by: PHYSICIAN ASSISTANT

## 2022-02-18 NOTE — PROGRESS NOTES
Yifan Arriola (: 1955) is a 74 y.o. female, established patient, here for evaluation of the following chief complaint(s):  Surgical Follow-up       SUBJECTIVE/OBJECTIVE:  Yifan Arriola (: 1955) is a 74 y.o. female. Right Total Hip Arthroplasty Anterior Approach - Right 2022     The patient is doing well at this time and is pleased with the results of her surgery and the progress which she has made to date. The patient has a history of factor V Leiden and was instructed to continue anticoagulation by her hematologist on until she is fully mobile. Allergies   Allergen Reactions    Ciprofloxacin Rash    Oxycontin [Oxycodone] Shortness of Breath and Itching    Sulfa (Sulfonamide Antibiotics) Rash    Zocor [Simvastatin] Myalgia       Current Outpatient Medications   Medication Sig    rivaroxaban (XARELTO) 20 mg tab tablet Take 1 Tablet by mouth daily (with breakfast). Indications: deep vein thrombosis prevention, post op DVT prevention    CALCIUM PO Take 50,000 Units by mouth every seven (7) days. THURSDAY    gabapentin (NEURONTIN) 100 mg capsule Take 200 mg by mouth four (4) times daily.  montelukast (SINGULAIR) 10 mg tablet Take 10 mg by mouth every evening.  amantadine HCL (SYMMETREL) 100 mg capsule Take 100 mg by mouth every evening.  meclizine (ANTIVERT) 25 mg tablet Take 25 mg by mouth three (3) times daily as needed for Dizziness.  OTHER as needed. CANNABIS HEMP HOT CREAM    polyethylene glycol 400 (Blink Gel Tears) 0.25 % drpg Apply  to eye as needed.  Menthol (Stopain) 8 % spry by Apply Externally route as needed. ROLL ON    carbidopa-levodopa (SINEMET)  mg per tablet Take 1 Tab by mouth four (4) times daily.  turmeric root extract 500 mg cap Take  by mouth.  rOPINIRole (REQUIP XL) 6 mg Tb24 Take  by mouth every evening.  Nebulizer & Compressor machine 1 Each by Does Not Apply route four (4) times daily as needed.     levalbuterol (XOPENEX) 0.63 mg/3 mL nebu 3 mL by Nebulization route every six (6) hours as needed.  rasagiline (AZILECT) 1 mg tablet Take 1 Tab by mouth daily. (Patient taking differently: Take 1 mg by mouth every evening.)    atorvastatin (LIPITOR) 10 mg tablet Take  by mouth every evening.  SODIUM CHLORIDE/SODIUM BICARB (NEILMED SINUS RINSE COMPLETE NA) by Nasal route.  polyethylene glycol (MIRALAX) 17 gram packet Take 17 g by mouth as needed.  ergocalciferol (VITAMIN D) 50,000 unit capsule Take 50,000 Units by mouth every seven (7) days.  lansoprazole (PREVACID SOLUTAB) 30 mg disintegrating tablet Take  by mouth Every morning.  hydrochlorothiazide (HYDRODIURIL) 25 mg tablet Take 25 mg by mouth every evening.  budesonide (PULMICORT) 180 mcg/Inhalation AePB inhaler Take 2 Puffs by inhalation two (2) times a day. No current facility-administered medications for this visit. Social History     Socioeconomic History    Marital status:      Spouse name: Not on file    Number of children: Not on file    Years of education: Not on file    Highest education level: Not on file   Occupational History    Not on file   Tobacco Use    Smoking status: Never Smoker    Smokeless tobacco: Never Used   Vaping Use    Vaping Use: Never used   Substance and Sexual Activity    Alcohol use: Yes     Comment: 7 GLASSES/WEEKLY    Drug use: No    Sexual activity: Not on file   Other Topics Concern    Not on file   Social History Narrative    Not on file     Social Determinants of Health     Financial Resource Strain:     Difficulty of Paying Living Expenses: Not on file   Food Insecurity:     Worried About Running Out of Food in the Last Year: Not on file    Lindsay of Food in the Last Year: Not on file   Transportation Needs:     Lack of Transportation (Medical): Not on file    Lack of Transportation (Non-Medical):  Not on file   Physical Activity:     Days of Exercise per Week: Not on file    Minutes of Exercise per Session: Not on file   Stress:     Feeling of Stress : Not on file   Social Connections:     Frequency of Communication with Friends and Family: Not on file    Frequency of Social Gatherings with Friends and Family: Not on file    Attends Mormonism Services: Not on file    Active Member of Clubs or Organizations: Not on file    Attends Club or Organization Meetings: Not on file    Marital Status: Not on file   Intimate Partner Violence:     Fear of Current or Ex-Partner: Not on file    Emotionally Abused: Not on file    Physically Abused: Not on file    Sexually Abused: Not on file   Housing Stability:     Unable to Pay for Housing in the Last Year: Not on file    Number of [place] in the Last Year: Not on file    Unstable Housing in the Last Year: Not on file       Past Surgical History:   Procedure Laterality Date    HX APPENDECTOMY  1961    HX GI      COLONSCOPY/EGD    HX HIP REPLACEMENT Left 07/2011    HX ORTHOPAEDIC      left hip replacement    HX TONSILLECTOMY         Family History   Problem Relation Age of Onset    Dementia Mother         AD Lewy Bodies    Hypertension Father     Asthma Father     Other Father         CONFUSION/ B/P DROPS    Cancer Paternal Aunt         STOMACH CA    Alcohol abuse Maternal Grandfather     Diabetes Paternal Grandfather     Heart Disease Paternal Grandfather         MI    Stroke Paternal Grandfather     Cancer Paternal Aunt         LEUKEMIA    Anesth Problems Neg Hx         OB History    No obstetric history on file. REVIEW OF SYSTEMS:    Patient denies any recent fever, chills, nausea, vomiting, chest pain, abdominal pain, blurred vision, dizziness or shortness of breath. Vitals:  Ht 5' 1\" (1.549 m)   Wt 170 lb (77.1 kg)   BMI 32.12 kg/m²    Body mass index is 32.12 kg/m². PHYSICAL EXAM:    Patient is alert and oriented x3 and in no acute distress.   The postoperative wound is well-healed without erythema or drainage. Patient has pain-free range of motion of the operative hip. There is no calf tenderness to palpation. Distal motor and sensation is intact. IMAGING:    Results from Appointment encounter on 02/18/22    XR HIP RT W OR WO PELV 2-3 VWS    Narrative  AP pelvis, AP and frog lateral digital view radiographs of the right hip were obtained in the office today and reviewed with the patient and demonstrate anatomic alignment of components with no evidence of hardware loosening or subsidence. Orders Placed This Encounter    XR HIP RT W OR WO PELV 2-3 VWS     Standing Status:   Future     Number of Occurrences:   1     Standing Expiration Date:   2/19/2023    REFERRAL TO PHYSICAL THERAPY     Referral Priority:   Routine     Referral Type:   PT/OT/ST     Referral Reason:   Specialty Services Required     Requested Specialty:   Physical Therapy     Number of Visits Requested:   1          ASSESSMENT/PLAN:      1. Status post total replacement of right hip  -     XR HIP RT W OR WO PELV 2-3 VWS; Future  -     REFERRAL TO PHYSICAL THERAPY        Below is the assessment and plan developed based on review of pertinent history, physical exam, labs, studies, and medications. Discussed radiographic findings and have answered all patient questions to her satisfaction. The patient is to continue DVT prophylaxis for 2 more weeks, per her hematologist's recommendation. . Have provided the patient with a prescription for outpatient PT for ROM and strengthening. Have asked the patient to follow up in 8 weeks time for reevaluation with Dr Arian Lee, sooner if she should develop any surgery related issues. The patient was asked to contact the office with any questions or concerns. The patient understands and agrees to the treatment plan as outlined above. Return in about 2 months (around 4/18/2022) for post op follow up. Dr. Arian Lee was available for immediate consultation as needed.      An electronic signature was used to authenticate this note.   -- Bob Coronado PA-C

## 2022-03-20 PROBLEM — M16.11 PRIMARY LOCALIZED OSTEOARTHRITIS OF RIGHT HIP: Status: ACTIVE | Noted: 2022-01-18

## 2022-03-31 ENCOUNTER — TRANSCRIBE ORDER (OUTPATIENT)
Dept: SCHEDULING | Age: 67
End: 2022-03-31

## 2022-03-31 DIAGNOSIS — Z12.31 ENCOUNTER FOR SCREENING MAMMOGRAM FOR MALIGNANT NEOPLASM OF BREAST: Primary | ICD-10-CM

## 2022-04-07 ENCOUNTER — OFFICE VISIT (OUTPATIENT)
Dept: ORTHOPEDIC SURGERY | Age: 67
End: 2022-04-07
Payer: MEDICARE

## 2022-04-07 VITALS — WEIGHT: 170 LBS | BODY MASS INDEX: 32.1 KG/M2 | HEIGHT: 61 IN

## 2022-04-07 DIAGNOSIS — Z96.641 STATUS POST RIGHT HIP REPLACEMENT: Primary | ICD-10-CM

## 2022-04-07 PROCEDURE — 99024 POSTOP FOLLOW-UP VISIT: CPT | Performed by: ORTHOPAEDIC SURGERY

## 2022-04-07 NOTE — PROGRESS NOTES
Sasha Alberto (: 1955) is a 74 y.o. female, patient, here for evaluation of the following chief complaint(s):  Surgical Follow-up (second post RTHA follow up )       HPI:    Chief complaint status post right total hip. Doing great. No issues. Allergies   Allergen Reactions    Ciprofloxacin Rash    Oxycontin [Oxycodone] Shortness of Breath and Itching    Sulfa (Sulfonamide Antibiotics) Rash    Zocor [Simvastatin] Myalgia       Current Outpatient Medications   Medication Sig    rivaroxaban (Xarelto) 20 mg tab tablet Take 1 Tablet by mouth daily. Indications: deep vein thrombosis prevention, Factor V Leiden    rivaroxaban (XARELTO) 20 mg tab tablet Take 1 Tablet by mouth daily (with breakfast). Indications: deep vein thrombosis prevention, post op DVT prevention    CALCIUM PO Take 50,000 Units by mouth every seven (7) days. THURSDAY    gabapentin (NEURONTIN) 100 mg capsule Take 200 mg by mouth four (4) times daily.  montelukast (SINGULAIR) 10 mg tablet Take 10 mg by mouth every evening.  amantadine HCL (SYMMETREL) 100 mg capsule Take 100 mg by mouth every evening.  meclizine (ANTIVERT) 25 mg tablet Take 25 mg by mouth three (3) times daily as needed for Dizziness.  OTHER as needed. CANNABIS HEMP HOT CREAM    polyethylene glycol 400 (Blink Gel Tears) 0.25 % drpg Apply  to eye as needed.  Menthol (Stopain) 8 % spry by Apply Externally route as needed. ROLL ON    carbidopa-levodopa (SINEMET)  mg per tablet Take 1 Tab by mouth four (4) times daily.  turmeric root extract 500 mg cap Take  by mouth.  rOPINIRole (REQUIP XL) 6 mg Tb24 Take  by mouth every evening.  Nebulizer & Compressor machine 1 Each by Does Not Apply route four (4) times daily as needed.  levalbuterol (XOPENEX) 0.63 mg/3 mL nebu 3 mL by Nebulization route every six (6) hours as needed.  rasagiline (AZILECT) 1 mg tablet Take 1 Tab by mouth daily.  (Patient taking differently: Take 1 mg by mouth every evening.)    atorvastatin (LIPITOR) 10 mg tablet Take  by mouth every evening.  SODIUM CHLORIDE/SODIUM BICARB (NEILMED SINUS RINSE COMPLETE NA) by Nasal route.  polyethylene glycol (MIRALAX) 17 gram packet Take 17 g by mouth as needed.  ergocalciferol (VITAMIN D) 50,000 unit capsule Take 50,000 Units by mouth every seven (7) days.  lansoprazole (PREVACID SOLUTAB) 30 mg disintegrating tablet Take  by mouth Every morning.  hydrochlorothiazide (HYDRODIURIL) 25 mg tablet Take 25 mg by mouth every evening.  budesonide (PULMICORT) 180 mcg/Inhalation AePB inhaler Take 2 Puffs by inhalation two (2) times a day. No current facility-administered medications for this visit.        Past Medical History:   Diagnosis Date    Arthritis     Asthma     Cancer ([facility] [address] 2006    MELANOMA    Chronic pain     BACK    Constipation     Embolism - blood clot 2011    DVT    Factor 5 Leiden mutation, heterozygous ([facility] [address]     Gallbladder polyp     Gastro-oesophageal reflux disease with hiatal hernia     GERD (gastroesophageal reflux disease)     Hypertension     Liver disease     ELEVATED LIVER ENZYMES 2011    Melanoma ([facility] [address]     Osteoporosis     Parkinson's disease     Seizures ([facility] [address]     FEBRILE SEIZURES AS A CHILD    Spinal stenosis of lumbar region     Steroid-induced avascular necrosis of hip (HCC)         Past Surgical History:   Procedure Laterality Date    HX APPENDECTOMY  1961    HX GI      COLONSCOPY/EGD    HX HIP REPLACEMENT Left 07/2011    HX ORTHOPAEDIC      left hip replacement    HX TONSILLECTOMY         Family History   Problem Relation Age of Onset    Dementia Mother         AD Lewy Bodies    Hypertension Father     Asthma Father     Other Father         CONFUSION/ B/P DROPS    Cancer Paternal Aunt         STOMACH CA    Alcohol abuse Maternal Grandfather     Diabetes Paternal Grandfather     Heart Disease Paternal Grandfather         MI    Stroke Paternal Grandfather     Cancer Paternal Aunt         LEUKEMIA    Anesth Problems Neg Hx         Social History     Socioeconomic History    Marital status:      Spouse name: Not on file    Number of children: Not on file    Years of education: Not on file    Highest education level: Not on file   Occupational History    Not on file   Tobacco Use    Smoking status: Never Smoker    Smokeless tobacco: Never Used   Vaping Use    Vaping Use: Never used   Substance and Sexual Activity    Alcohol use: Yes     Comment: 7 GLASSES/WEEKLY    Drug use: No    Sexual activity: Not on file   Other Topics Concern    Not on file   Social History Narrative    Not on file     Social Determinants of Health     Financial Resource Strain:     Difficulty of Paying Living Expenses: Not on file   Food Insecurity:     Worried About Running Out of Food in the Last Year: Not on file    Lindsay of Food in the Last Year: Not on file   Transportation Needs:     Lack of Transportation (Medical): Not on file    Lack of Transportation (Non-Medical):  Not on file   Physical Activity:     Days of Exercise per Week: Not on file    Minutes of Exercise per Session: Not on file   Stress:     Feeling of Stress : Not on file   Social Connections:     Frequency of Communication with Friends and Family: Not on file    Frequency of Social Gatherings with Friends and Family: Not on file    Attends Gnosticist Services: Not on file    Active Member of [address] [place] [address] [employer] or Organizations: Not on file    Attends Club or Organization Meetings: Not on file    Marital Status: Not on file   Intimate Partner Violence:     Fear of Current or Ex-Partner: Not on file    Emotionally Abused: Not on file    Physically Abused: Not on file    Sexually Abused: Not on file   Housing Stability:     Unable to Pay for Housing in the Last Year: Not on file    Number of [place] in the Last Year: Not on file    Unstable Housing in the Last Year: Not on file       ROS     Positive for: Musculoskeletal    Last edited by Ventura Morgan on 4/7/2022  2:33 PM. (History)            Vitals:  Ht 5' 1\" (1.549 m)   Wt 170 lb (77.1 kg)   BMI 32.12 kg/m²    Body mass index is 32.12 kg/m². PHYSICAL EXAM:  Incision well-healed. Walks with no assistive devices. IMAGING:  None    ASSESSMENT/PLAN:  1. Status post right hip replacement    Doing well post right total hip. No issues. Resume activities as tolerated. Follow-up 9 months. An electronic signature was used to authenticate this note.   --Celio Otero MD

## 2022-04-14 DIAGNOSIS — Z96.641 STATUS POST TOTAL REPLACEMENT OF RIGHT HIP: Primary | ICD-10-CM

## 2022-05-25 ENCOUNTER — HOSPITAL ENCOUNTER (OUTPATIENT)
Dept: GENERAL RADIOLOGY | Age: 67
Discharge: HOME | End: 2022-05-25
Attending: INTERNAL MEDICINE
Payer: MEDICARE

## 2022-05-25 ENCOUNTER — TRANSCRIBE ORDER (OUTPATIENT)
Dept: GENERAL RADIOLOGY | Age: 67
End: 2022-05-25

## 2022-05-25 DIAGNOSIS — M79.672 LEFT FOOT PAIN: Primary | ICD-10-CM

## 2022-05-25 DIAGNOSIS — M79.672 LEFT FOOT PAIN: ICD-10-CM

## 2022-05-25 DIAGNOSIS — M75.01 ADHESIVE CAPSULITIS OF RIGHT SHOULDER: ICD-10-CM

## 2022-05-25 PROCEDURE — 73630 X-RAY EXAM OF FOOT: CPT

## 2022-05-25 PROCEDURE — 73030 X-RAY EXAM OF SHOULDER: CPT

## 2022-06-15 ENCOUNTER — OFFICE VISIT (OUTPATIENT)
Dept: ORTHOPEDIC SURGERY | Age: 67
End: 2022-06-15
Payer: MEDICARE

## 2022-06-15 VITALS — HEIGHT: 61 IN | BODY MASS INDEX: 32.1 KG/M2 | WEIGHT: 170 LBS

## 2022-06-15 DIAGNOSIS — Z96.641 STATUS POST TOTAL REPLACEMENT OF RIGHT HIP: Primary | ICD-10-CM

## 2022-06-15 PROCEDURE — 1101F PT FALLS ASSESS-DOCD LE1/YR: CPT | Performed by: ORTHOPAEDIC SURGERY

## 2022-06-15 PROCEDURE — 99213 OFFICE O/P EST LOW 20 MIN: CPT | Performed by: ORTHOPAEDIC SURGERY

## 2022-06-15 PROCEDURE — G9899 SCRN MAM PERF RSLTS DOC: HCPCS | Performed by: ORTHOPAEDIC SURGERY

## 2022-06-15 PROCEDURE — G8427 DOCREV CUR MEDS BY ELIG CLIN: HCPCS | Performed by: ORTHOPAEDIC SURGERY

## 2022-06-15 PROCEDURE — G8536 NO DOC ELDER MAL SCRN: HCPCS | Performed by: ORTHOPAEDIC SURGERY

## 2022-06-15 PROCEDURE — 3017F COLORECTAL CA SCREEN DOC REV: CPT | Performed by: ORTHOPAEDIC SURGERY

## 2022-06-15 PROCEDURE — G8399 PT W/DXA RESULTS DOCUMENT: HCPCS | Performed by: ORTHOPAEDIC SURGERY

## 2022-06-15 PROCEDURE — 1123F ACP DISCUSS/DSCN MKR DOCD: CPT | Performed by: ORTHOPAEDIC SURGERY

## 2022-06-15 PROCEDURE — G8417 CALC BMI ABV UP PARAM F/U: HCPCS | Performed by: ORTHOPAEDIC SURGERY

## 2022-06-15 PROCEDURE — G8432 DEP SCR NOT DOC, RNG: HCPCS | Performed by: ORTHOPAEDIC SURGERY

## 2022-06-15 PROCEDURE — 1090F PRES/ABSN URINE INCON ASSESS: CPT | Performed by: ORTHOPAEDIC SURGERY

## 2022-06-15 NOTE — PROGRESS NOTES
Maris Gilliam (: 1955) is a 74 y.o. female, patient, here for evaluation of the following chief complaint(s):  Hip Pain (right hip pain, RTHA done 2022)       HPI:    6 months post total hip. Seems to have had some increasing pain in her hip presents today for evaluation. Allergies   Allergen Reactions    Ciprofloxacin Rash    Oxycontin [Oxycodone] Shortness of Breath and Itching    Sulfa (Sulfonamide Antibiotics) Rash    Zocor [Simvastatin] Myalgia       Current Outpatient Medications   Medication Sig    rivaroxaban (Xarelto) 20 mg tab tablet Take 1 Tablet by mouth daily. Indications: deep vein thrombosis prevention, Factor V Leiden    rivaroxaban (XARELTO) 20 mg tab tablet Take 1 Tablet by mouth daily (with breakfast). Indications: deep vein thrombosis prevention, post op DVT prevention    CALCIUM PO Take 50,000 Units by mouth every seven (7) days. THURSDAY    gabapentin (NEURONTIN) 100 mg capsule Take 200 mg by mouth four (4) times daily.  montelukast (SINGULAIR) 10 mg tablet Take 10 mg by mouth every evening.  amantadine HCL (SYMMETREL) 100 mg capsule Take 100 mg by mouth every evening.  meclizine (ANTIVERT) 25 mg tablet Take 25 mg by mouth three (3) times daily as needed for Dizziness.  OTHER as needed. CANNABIS HEMP HOT CREAM    polyethylene glycol 400 (Blink Gel Tears) 0.25 % drpg Apply  to eye as needed.  Menthol (Stopain) 8 % spry by Apply Externally route as needed. ROLL ON    carbidopa-levodopa (SINEMET)  mg per tablet Take 1 Tab by mouth four (4) times daily.  turmeric root extract 500 mg cap Take  by mouth.  rOPINIRole (REQUIP XL) 6 mg Tb24 Take  by mouth every evening.  Nebulizer & Compressor machine 1 Each by Does Not Apply route four (4) times daily as needed.  levalbuterol (XOPENEX) 0.63 mg/3 mL nebu 3 mL by Nebulization route every six (6) hours as needed.  rasagiline (AZILECT) 1 mg tablet Take 1 Tab by mouth daily.  (Patient taking differently: Take 1 mg by mouth every evening.)    atorvastatin (LIPITOR) 10 mg tablet Take  by mouth every evening.  SODIUM CHLORIDE/SODIUM BICARB (NEILMED SINUS RINSE COMPLETE NA) by Nasal route.  polyethylene glycol (MIRALAX) 17 gram packet Take 17 g by mouth as needed.  ergocalciferol (VITAMIN D) 50,000 unit capsule Take 50,000 Units by mouth every seven (7) days.  lansoprazole (PREVACID SOLUTAB) 30 mg disintegrating tablet Take  by mouth Every morning.  hydrochlorothiazide (HYDRODIURIL) 25 mg tablet Take 25 mg by mouth every evening.  budesonide (PULMICORT) 180 mcg/Inhalation AePB inhaler Take 2 Puffs by inhalation two (2) times a day. No current facility-administered medications for this visit.        Past Medical History:   Diagnosis Date    Arthritis     Asthma     Cancer ([facility] [address] 2006    MELANOMA    Chronic pain     BACK    Constipation     Embolism - blood clot 2011    DVT    Factor 5 Leiden mutation, heterozygous ([facility] [address]     Gallbladder polyp     Gastro-oesophageal reflux disease with hiatal hernia     GERD (gastroesophageal reflux disease)     Hypertension     Liver disease     ELEVATED LIVER ENZYMES 2011    Melanoma ([facility] [address]     Osteoporosis     Parkinson's disease     Seizures ([facility] [address]     FEBRILE SEIZURES AS A CHILD    Spinal stenosis of lumbar region     Steroid-induced avascular necrosis of hip (HCC)         Past Surgical History:   Procedure Laterality Date    HX APPENDECTOMY  1961    HX GI      COLONSCOPY/EGD    HX HIP REPLACEMENT Left 07/2011    HX ORTHOPAEDIC      left hip replacement    HX TONSILLECTOMY         Family History   Problem Relation Age of Onset    Dementia Mother         AD Lewy Bodies    Hypertension Father     Asthma Father     Other Father         CONFUSION/ B/P DROPS    Cancer Paternal Aunt         STOMACH CA    Alcohol abuse Maternal Grandfather     Diabetes Paternal Grandfather     Heart Disease Paternal Grandfather         MCKINLEY [place] Stroke Paternal Grandfather     Cancer Paternal Aunt         LEUKEMIA    Anesth Problems Neg Hx         Social History     Socioeconomic History    Marital status:      Spouse name: Not on file    Number of children: Not on file    Years of education: Not on file    Highest education level: Not on file   Occupational History    Not on file   Tobacco Use    Smoking status: Never Smoker    Smokeless tobacco: Never Used   Vaping Use    Vaping Use: Never used   Substance and Sexual Activity    Alcohol use: Yes     Comment: 7 GLASSES/WEEKLY    Drug use: No    Sexual activity: Not on file   Other Topics Concern    Not on file   Social History Narrative    Not on file     Social Determinants of Health     Financial Resource Strain:     Difficulty of Paying Living Expenses: Not on file   Food Insecurity:     Worried About Running Out of Food in the Last Year: Not on file    Lindsay of Food in the Last Year: Not on file   Transportation Needs:     Lack of Transportation (Medical): Not on file    Lack of Transportation (Non-Medical):  Not on file   Physical Activity:     Days of Exercise per Week: Not on file    Minutes of Exercise per Session: Not on file   Stress:     Feeling of Stress : Not on file   Social Connections:     Frequency of Communication with Friends and Family: Not on file    Frequency of Social Gatherings with Friends and Family: Not on file    Attends Mandaeism Services: Not on file    Active Member of Clubs or Organizations: Not on file    Attends Club or Organization Meetings: Not on file    Marital Status: Not on file   Intimate Partner Violence:     Fear of Current or Ex-Partner: Not on file    Emotionally Abused: Not on file    Physically Abused: Not on file    Sexually Abused: Not on file   Housing Stability:     Unable to Pay for Housing in the Last Year: Not on file    Number of [place] in the Last Year: Not on file    Unstable Housing in the Last Year: Not on file       ROS     Positive for: Musculoskeletal    Last edited by Napoleon Pierce on 6/15/2022  4:09 PM. (History)            Vitals:  Ht 5' 1\" (1.549 m)   Wt 170 lb (77.1 kg)   BMI 32.12 kg/m²    Body mass index is 32.12 kg/m². PHYSICAL EXAM:  On exam her incision is well-healed. No area of fluctuance noted. IMAGING:  XR Results (most recent):  Results from Appointment encounter on 06/15/22    XR HIP RT [facility]. 2-3 VWS    Narrative  3 x-ray views right hip. Implant is unchanged in position and well integrated into the bone. I see no issues. She has extensive degenerative disc disease and facet disease at L4-L5 and L5-S1.        ASSESSMENT/PLAN:  1. Status post total replacement of right hip  -     XR HIP RT W OR WO PELV 2-3 VWS; Future    Reassured. Going to observe this for now. Did not see anything of concern on exam or x-ray today. An electronic signature was used to authenticate this note.   --Florinda Cochran MD

## 2022-09-20 NOTE — PROGRESS NOTES
Problem: Mobility Impaired (Adult and Pediatric)  Goal: *Acute Goals and Plan of Care (Insert Text)  Description: FUNCTIONAL STATUS PRIOR TO ADMISSION: Patient was modified independent using a single point cane for functional mobility - but more recently using RW secondary to leg weakness and pain, however over past month using RW. Pt also reporting 4 falls over last month associated with vertigo - recently started Meclizine . HOME SUPPORT PRIOR TO ADMISSION: The patient lived with  - however, daughter reporting limited support from . Daughter and [id number] trying to help out. Physical Therapy Goals  Initiated 1/18/2022    1. Patient will move from supine to sit and sit to supine , scoot up and down, and roll side to side in bed with modified independence within 4 days. 2. Patient will perform sit to stand with modified independence within 4 days. 3. Patient will ambulate with modified independence for > 100 feet with the least restrictive device within 4 days. 4. Patient will ascend/descend 4 stairs with one handrail(s) with minimal assistance/contact guard assist within 4 days. 5. Patient will perform THR home exercise program per protocol with supervision/set-up within 4 days. 1/19/2022 1128 by Segundo Miller, PT, DPT  Outcome: Progressing Towards Goal   PHYSICAL THERAPY TREATMENT  Patient: Jalyn Escamilla (30 y.o. female)  Date: 1/19/2022  Diagnosis: Primary osteoarthritis of right hip [M16.11]  Primary localized osteoarthritis of right hip [M16.11] <principal problem not specified>  Procedure(s) (LRB):  RIGHT TOTAL HIP ARTHROPLASTY ANTERIOR APPROACH (Right) 1 Day Post-Op  Precautions: Fall,WBAT  Chart, physical therapy assessment, plan of care and goals were reviewed. ASSESSMENT  Patient continues with skilled PT services and is progressing towards goals. Patient overall limited by gait instability, impaired balance, shuffling gait pattern and decreased activity tolerance. Sylvia Alexander  2022              33 y.o. Chief Complaint   Patient presents with    Annual Exam     Last pap 20-WNL     Vaginal Discharge     [id number]     Vaginal Itching    Vaginal Odor         Patient's last menstrual period was 2022. Primary Care Physician: Vlad Burk MD    HPI : Sylvia Alexander is a 33 y.o. female [id number]    Patient is here today for her well women annual exam. She was seen and examined. Reports for the last 4-6 months she has had an increase in her vaginal discharge. States there is an odor and she has some pruritis with it. Denies any changes in color. Desires STD screening. Still has copper IUD in place. She has irregular spotting with it but overall is happy with her IUD and does not want it removed.   ________________________________________________________________________  OB History    Para Term  AB Living   3 3 3     3   SAB IAB Ectopic Molar Multiple Live Births           0 3      # Outcome Date GA Lbr Charles/2nd Weight Sex Delivery Anes PTL Lv   3 Term 20 39w1d 09:19 / 00:07 8 lb 7.3 oz (3.835 kg) M Vag-Spont EPI N ANDRES   2 Term     M Vag-Spont   ANDRES   1 Term     F Vag-Spont   ANDRES     Past Medical History:   Diagnosis Date    Thyroid disease     hypothyroid                                                                   Past Surgical History:   Procedure Laterality Date    APPENDECTOMY      INTRAUTERINE DEVICE INSERTION  11/10/2020    [place] - Dr. Destini Cannon     Family History   Problem Relation Age of Onset    Diabetes Father      Social History     Socioeconomic History    Marital status:      Spouse name: Not on file    Number of children: Not on file    Years of education: Not on file    Highest education level: Not on file   Occupational History    Not on file   Tobacco Use    Smoking status: Never    Smokeless tobacco: Never   Vaping Use    Vaping Use: Never used   Substance and Sexual Activity Alcohol use: Yes    Drug use: Never    Sexual activity: Yes     Birth control/protection: I.U.D. Other Topics Concern    Not on file   Social History Narrative    Not on file     Social Determinants of Health     Financial Resource Strain: Not on file   Food Insecurity: Not on file   Transportation Needs: Not on file   Physical Activity: Not on file   Stress: Not on file   Social Connections: Not on file   Intimate Partner Violence: Not on file   Housing Stability: Not on file       MEDICATIONS:  Current Outpatient Medications   Medication Sig Dispense Refill    levothyroxine (SYNTHROID) 75 MCG tablet Take 1 tablet by mouth Daily 30 tablet 11     No current facility-administered medications for this visit. ALLERGIES:  Allergies as of 09/20/2022    (No Known Allergies)       Symptoms of decreased mood: absent  Symptoms of anhedonia: absent    Immunization status: up to date and documented. - agreeable to start gardasil today     Gynecologic History:  Menarche: 13 yo - always been irregular. Has frequent spotting with IUD      Patient's last menstrual period was 09/06/2022.     Sexually Active: Yes- with males   STD History: No     Permanent Sterilization: No   Reversible Birth Control: Yes - Paraguard placed 2020       Hormone Replacement Exposure: No      Genetic Qualified Family History of Breast, Ovarian , Colon or Uterine Cancer: No      Preventative Health Testing:  Health Maintenance Due:  Health Maintenance Due   Topic Date Due    COVID-19 Vaccine (1) Never done    Varicella vaccine (1 of 2 - 2-dose childhood series) Never done    Depression Screen  Never done    Hepatitis C screen  Never done    Flu vaccine (1) Never done     Date of Last Pap Smear: 1/29/20 negative   Abnormal Pap Smear History: denies  Colposcopy History: denies  Date of Last Mammogram: denies  Date of Last Colonoscopy: denies  Date of Last Bone Density: Up in recliner chair and needing CGA to come to standing with increased time to stabilize. Amb approx 25 feet x 2 with RW and CGA with slow donald and decreased step clearance. Up/down 4 stairs with R rail using lateral approach and CGA x 2. Patient needing cues for safe foot placement but is able to ascend and descend with CGA. Patient would likely be safe to DC home with family support and [facility] PT follow up. Recommend minimally 1-2 more PT sessions to ensure safety.  is aware that patient will need continued physical assistance at home. Other factors to consider for discharge: at risk for falls, below baseline, will need physical assistance at home         PLAN :  Patient continues to benefit from skilled intervention to address the above impairments. Continue treatment per established plan of care. to address goals. Recommendation for discharge: (in order for the patient to meet his/her long term goals)  Physical therapy at least 2 days/week in the home AND ensure assist and/or supervision for safety with gait instability        IF patient discharges home will need the following DME: patient owns DME required for discharge       SUBJECTIVE:   Patient stated I think they gave me too much medicine.   Patient is difficult to understand at times and is dysarthric (baseline)  OBJECTIVE DATA SUMMARY:   Critical Behavior:  Neurologic State: Alert,Appropriate for age  Orientation Level: Oriented X4  Cognition: Appropriate decision making,Appropriate safety awareness,Follows commands  Safety/Judgement: Awareness of environment,Fall prevention,Decreased awareness of need for safety  Functional Mobility Training:  Bed Mobility:     Supine to Sit: Moderate assistance; Additional time     Scooting:  Moderate assistance        Transfers:  Sit to Stand: Contact guard assistance  Stand to Sit: Contact guard assistance;Assist x2                             Balance:  Sitting: Intact  Standing: Impaired  Standing - Static: Constant support;Good  Standing - Dynamic : Constant support; Fair  Ambulation/Gait Training:  Distance (ft): 25 Feet (ft) (then additional 25 after stairs)  Assistive Device: Gait belt;Walker, rolling  Ambulation - Level of Assistance: Contact guard assistance;Assist x2        Gait Abnormalities: Antalgic;Decreased step clearance; Step to gait  Right Side Weight Bearing: As tolerated     Base of Support: Center of gravity altered  Stance: Right decreased  Speed/Magdalene: Pace decreased (<100 feet/min); Slow  Step Length: Left shortened;Right shortened       Stairs:  Number of Stairs Trained: 4  Stairs - Level of Assistance: Contact guard assistance;Assist X2   Rail Use: Right  (lateral approach)        Pain Rating:  Reports pain but does not rate    Activity Tolerance:   Fair and requires rest breaks      After treatment patient left in no apparent distress:   Sitting in chair and Call bell within reach    COMMUNICATION/COLLABORATION:   The patients plan of care was discussed with: Physical therapist, Occupational therapist, and Registered nurse.      Dyana Estrada PT, DPT   Time Calculation: 23 mins denies  ________________________________________________________________________  REVIEW OF SYSTEMS:   Constitutional: negative fever, negative chills, negative weight changes   HEENT: negative visual disturbances, negative headaches, negative dizziness   Breast: negative breast abnormalities, negative breast lumps, negative nipple discharge  Respiratory: negative dyspnea, negative cough, negative SOB  Cardiovascular: negative chest pain, negative palpitations, negative syncope   Gastrointestinal: negative abdominal pain, negative N/V, negative bowel changes, negative heartburn   Genitourinary: negative dysuria, negative hematuria, negative urinary incontinence, pos vaginal discharge  Dermatological: negative rash, negative pruritis, negative mole changes  Hematologic: negative bruising  Immunologic/Lymphatic: negative recent illness, negative recent sick contact  Musculoskeletal: negative back pain, negative myalgias, negative arthralgias  Neurological:  negative dizziness, negative migraines   Behavior/Psych: negative SI, negative HI                                                                                                                                                                                 PHYSICAL Exam:     Constitutional:  Vitals:    09/20/22 1000   BP: 122/88   Site: Left Upper Arm   Position: Sitting   Cuff Size: Large Adult   Pulse: 75   Weight: 189 lb 12.8 oz (86.1 kg)   Height: 5' 2\" (1.575 m)         General appearance:  no apparent distress, alert and cooperative, BMI reviewed   HEENT: normocephalic, atraumatic, neck supple, no JVD, thyroid not enlarged with no palpable masses  Lymphatic: no lymph nodes were palpable in neck, axilla or groin   Neurologic:  normal speech, no focal findings or movement disorder noted  Lungs:  no increased work of breathing, good air exchange, clear to auscultation bilaterally, no crackles or wheezing  Heart:  regular rate and rhythm, no murmurs noted Abdomen:  soft, non-tender, no guarding, rebound or rigidity on palpation, bowel sounds appreciated in all quadrants   Musculoskeletal: normal gait noted, no gross abnormalities appreciated, range of motion, stability and strength were evaluated and found to be appropriate for patient's age   Extremities:  no calf tenderness bilaterally, non-edematous bilaterally  Skin: normal inspection of skin without rashes or lesions  Psych:  normal orientation to time, place and person, good historian, no mood or affect changes, pleasant     Pelvic Exam: chaperone declined    Vulva: normal appearing vulva, no masses, tenderness or lesions, normal clitoris    Vagina: normal appearing urethral meatus, normal appearing vaginal mucosa with normal color, thick white discharge present, no lesions, no vaginal bleeding   Cervix: normal appearing cervix without pathologic appearing discharge or lesions, IUD strings seen at os,  no cervical motion tenderness   Uterus: anteverted, normal size, shape, consistency and non-tender, bladder smooth   Adnexa: non-tender, no palpable masses   Rectal Exam: external exam wnl   Breast: no tenderness on palpation, no masses appreciated, no nipple retraction, dimpling, discharge or bleeding, no axillary or supraclavicular adenopathy, self breast exams were reviewed in detail       ASSESSMENT/PLAN:  Noemí Galeano is a 33 y.o. female  here for her annual exam      Well Women Gynecological Exam   - VSS     - Pap smear not due this yr   - Vaginitis and GC/C collected and pending. Will call patient with any abnormal results    - Birth control and barrier recommendations discussed. Has IUD in place. Strings seen today    - STD counseling and prevention reviewed.  Patient requested blood work for T.Pal, Hepatitis and HIV testing    - Gardasil counseling completed for all patients 10-36 years old    -  Patient would like to start vaccine series today     -  Patient to return in 2 months and 6 months to complete series    - Routine health maintenance per patient's PCP   - Repeat annual exam every year   Return in about 1 year (around 2023) for well women . Counseling Completed:  Hereditary breast, ovarian, colon and uterine cancer screening done  Tobacco and secondary smoke risks reviewed. Instructed on cessation and avoidance     Orders Placed This Encounter   Procedures    Vaginitis DNA Probe     Standing Status:   Future     Standing Expiration Date:   2023    C.trachomatis N.gonorrhoeae DNA     Standing Status:   Future     Standing Expiration Date:   2023    HPV, GARDASIL 9, (AGE 9-45 YRS), IM    HIV Screen     Standing Status:   Future     Standing Expiration Date:   2023    T. Pallidum Ab     Standing Status:   Future     Standing Expiration Date:   2023    Hepatitis B Surface Antigen     Standing Status:   Future     Standing Expiration Date:   2023    Hepatitis C Antibody     Standing Status:   Future     Standing Expiration Date:   2023    CT INJECTION,THERAP/PROPH/DIAGNOST, IM OR SUBCUT        Patient Active Problem List    Diagnosis Date Noted     18 M APG  Wt 8#7 2020     Priority: High    39 weeks gestation of pregnancy 2020    History of appendectomy- laparoscopic 2020    Rh+/RI/GBSneg 2020    Hypothyroidism 2020     On Synthroid 75 mcg QD.  Last TSH 20 WNL (1.05)          DO Shruthi Mantilla OB/GY[facility]  2022, 11:35 AM

## 2022-09-30 ENCOUNTER — OFFICE VISIT (OUTPATIENT)
Dept: ORTHOPEDIC SURGERY | Age: 67
End: 2022-09-30
Payer: MEDICARE

## 2022-09-30 VITALS — WEIGHT: 170 LBS | HEIGHT: 61 IN | BODY MASS INDEX: 32.1 KG/M2

## 2022-09-30 DIAGNOSIS — M19.072 ARTHRITIS OF MIDTARSAL JOINT OF LEFT FOOT: Primary | ICD-10-CM

## 2022-09-30 DIAGNOSIS — M79.672 LEFT FOOT PAIN: ICD-10-CM

## 2022-09-30 PROCEDURE — 1090F PRES/ABSN URINE INCON ASSESS: CPT | Performed by: ORTHOPAEDIC SURGERY

## 2022-09-30 PROCEDURE — 3017F COLORECTAL CA SCREEN DOC REV: CPT | Performed by: ORTHOPAEDIC SURGERY

## 2022-09-30 PROCEDURE — G8427 DOCREV CUR MEDS BY ELIG CLIN: HCPCS | Performed by: ORTHOPAEDIC SURGERY

## 2022-09-30 PROCEDURE — 99213 OFFICE O/P EST LOW 20 MIN: CPT | Performed by: ORTHOPAEDIC SURGERY

## 2022-09-30 PROCEDURE — 1101F PT FALLS ASSESS-DOCD LE1/YR: CPT | Performed by: ORTHOPAEDIC SURGERY

## 2022-09-30 PROCEDURE — 1123F ACP DISCUSS/DSCN MKR DOCD: CPT | Performed by: ORTHOPAEDIC SURGERY

## 2022-09-30 PROCEDURE — G8399 PT W/DXA RESULTS DOCUMENT: HCPCS | Performed by: ORTHOPAEDIC SURGERY

## 2022-09-30 PROCEDURE — G8432 DEP SCR NOT DOC, RNG: HCPCS | Performed by: ORTHOPAEDIC SURGERY

## 2022-09-30 PROCEDURE — G8536 NO DOC ELDER MAL SCRN: HCPCS | Performed by: ORTHOPAEDIC SURGERY

## 2022-09-30 PROCEDURE — G9899 SCRN MAM PERF RSLTS DOC: HCPCS | Performed by: ORTHOPAEDIC SURGERY

## 2022-09-30 PROCEDURE — G8417 CALC BMI ABV UP PARAM F/U: HCPCS | Performed by: ORTHOPAEDIC SURGERY

## 2022-09-30 NOTE — LETTER
10/5/2022    Patient: Nikia Evans   YOB: 1955   Date of Visit: 9/30/2022     Shyla Bill MD  [place] [id number] [address]  Via Fax: [phone]    Dear Shyla Bill MD,      Thank you for referring Ms. Pita Liriano to [facility] for evaluation. My notes for this consultation are attached. If you have questions, please do not hesitate to call me. I look forward to following your patient along with you.       Sincerely,    Benny Haile MD

## 2022-09-30 NOTE — PROGRESS NOTES
Morenita Peraza (: 1955) is a 69 y.o. female, patient,here for evaluation of the following   Chief Complaint   Patient presents with    Foot Pain        ASSESSMENT/PLAN:  Below is the assessment and plan developed based on review of pertinent history, physical exam, labs, studies, and medications. 1. Arthritis of midtarsal joint of left foot  2. Left foot pain    Patient is informed of findings on exam and x-rays, she does have midtarsal arthritis involving the first, second and third tarsometatarsal joints with the first being the worst with bone spur present. This is reason for swelling to the foot. Her symptoms are not severe so I do recommend shoewear modification, provided information about types of shoes and insoles to try. Topical anti-inflammatory medications may be helpful. If her pain becomes more severe, Medrol pack is an option to try. Currently not surgical candidate as her symptoms are not the worst at this time. Activities as tolerated. Return in about 6 weeks (around 2022), or if symptoms worsen or fail to improve, for cortizone injection. Allergies   Allergen Reactions    Ciprofloxacin Rash    Oxycontin [Oxycodone] Shortness of Breath and Itching    Sulfa (Sulfonamide Antibiotics) Rash    Zocor [Simvastatin] Myalgia       Current Outpatient Medications   Medication Sig    rivaroxaban (Xarelto) 20 mg tab tablet Take 1 Tablet by mouth daily. Indications: deep vein thrombosis prevention, Factor V Leiden    rivaroxaban (XARELTO) 20 mg tab tablet Take 1 Tablet by mouth daily (with breakfast). Indications: deep vein thrombosis prevention, post op DVT prevention    CALCIUM PO Take 50,000 Units by mouth every seven (7) days. THURSDAY    gabapentin (NEURONTIN) 100 mg capsule Take 200 mg by mouth four (4) times daily. montelukast (SINGULAIR) 10 mg tablet Take 10 mg by mouth every evening. amantadine HCL (SYMMETREL) 100 mg capsule Take 100 mg by mouth every evening. meclizine (ANTIVERT) 25 mg tablet Take 25 mg by mouth three (3) times daily as needed for Dizziness. OTHER as needed. CANNABIS HEMP HOT CREAM    polyethylene glycol 400 (Blink Gel Tears) 0.25 % drpg Apply  to eye as needed. Menthol (Stopain) 8 % spry by Apply Externally route as needed. ROLL ON    carbidopa-levodopa (SINEMET)  mg per tablet Take 1 Tab by mouth four (4) times daily. turmeric root extract 500 mg cap Take  by mouth. rOPINIRole (REQUIP XL) 6 mg Tb24 Take  by mouth every evening. Nebulizer & Compressor machine 1 Each by Does Not Apply route four (4) times daily as needed. levalbuterol (XOPENEX) 0.63 mg/3 mL nebu 3 mL by Nebulization route every six (6) hours as needed. rasagiline (AZILECT) 1 mg tablet Take 1 Tab by mouth daily. (Patient taking differently: Take 1 mg by mouth every evening.)    atorvastatin (LIPITOR) 10 mg tablet Take  by mouth every evening. SODIUM CHLORIDE/SODIUM BICARB (NEILMED SINUS RINSE COMPLETE NA) by Nasal route. polyethylene glycol (MIRALAX) 17 gram packet Take 17 g by mouth as needed. ergocalciferol (VITAMIN D) 50,000 unit capsule Take 50,000 Units by mouth every seven (7) days. lansoprazole (PREVACID SOLUTAB) 30 mg disintegrating tablet Take  by mouth Every morning. hydrochlorothiazide (HYDRODIURIL) 25 mg tablet Take 25 mg by mouth every evening. budesonide (PULMICORT) 180 mcg/Inhalation AePB inhaler Take 2 Puffs by inhalation two (2) times a day. No current facility-administered medications for this visit.        Past Medical History:   Diagnosis Date    Arthritis     Asthma     Cancer ([facility] [address] 2006    MELANOMA    Chronic pain     BACK    Constipation     Embolism - blood clot 2011    DVT    Factor 5 Leiden mutation, heterozygous ([facility] [address]     Gallbladder polyp     Gastro-oesophageal reflux disease with hiatal hernia     GERD (gastroesophageal reflux disease)     Hypertension     Liver disease     ELEVATED LIVER ENZYMES 2011    Melanoma ([facility] [address]     Osteoporosis     Parkinson's disease     Seizures ([facility] [address]     FEBRILE SEIZURES AS A CHILD    Spinal stenosis of lumbar region     Steroid-induced avascular necrosis of hip (HCC)        Past Surgical History:   Procedure Laterality Date    HX APPENDECTOMY      HX GI      COLONSCOPY/EGD    HX HIP REPLACEMENT Left 2011    HX ORTHOPAEDIC      left hip replacement    HX TONSILLECTOMY         Family History   Problem Relation Age of Onset    Dementia Mother         AD Lewy Bodies    Hypertension Father     Asthma Father     Other Father         CONFUSION/ B/P DROPS    Cancer Paternal Aunt         STOMACH CA    Alcohol abuse Maternal Grandfather     Diabetes Paternal Grandfather     Heart Disease Paternal Grandfather         MI    Stroke Paternal Grandfather     Cancer Paternal Aunt         LEUKEMIA    Anesth Problems Neg Hx        Social History     Socioeconomic History    Marital status:      Spouse name: Not on file    Number of children: Not on file    Years of education: Not on file    Highest education level: Not on file   Occupational History    Not on file   Tobacco Use    Smoking status: Never    Smokeless tobacco: Never   Vaping Use    Vaping Use: Never used   Substance and Sexual Activity    Alcohol use: Yes     Comment: 7 GLASSES/WEEKLY    Drug use: No    Sexual activity: Not on file   Other Topics Concern    Not on file   Social History Narrative    Not on file     Social Determinants of Health     Financial Resource Strain: Not on file   Food Insecurity: Not on file   Transportation Needs: Not on file   Physical Activity: Not on file   Stress: Not on file   Social Connections: Not on file   Intimate Partner Violence: Not on file   Housing Stability: Not on file           Vitals:  Ht 5' 1\" (1.549 m)   Wt 170 lb (77.1 kg)   BMI 32.12 kg/m²    Body mass index is 32.12 kg/m².             SUBJECTIVE:  Michelle Pace (: 1955)   New patient presents today with complaint of left foot swelling at the top of the midfoot area, this is been ongoing for the past 6+ months. There is history of a fracture in the past at that same area. Gradual onset of symptoms. The pain is currently mild dull pain that comes and goes. There is some swelling at times, standing, squatting, stairs/steps, running walking make it worse. Patient has tried ice, aspirin, Tylenol and gabapentin. Patient has seen primary care physician for the same problem and referred to orthopedics. Patient is not diabetic, non-smoker. Has arthritis. OBJECTIVE EXAM:     Visit Vitals  Ht 5' 1\" (1.549 m)   Wt 170 lb (77.1 kg)   BMI 32.12 kg/m²       Appearance: Alert, well appearing and pleasant patient who is in no distress, oriented to person, place/time, and who follows commands. This patient is accompanied in the       office by her  self and . Psychiatric: Affect and mood are appropriate. No dementia noted on examination  Musculoskeletal:  LOCATION: Fuhs tenderness midfoot joints foot - left      Integumentary: No rashes, skin patches, wounds, or abrasions to the right or left legs       Warm and normal color. No regions of expressible drainage. Gait: Normal      Tenderness: No tenderness        Motor/Strength/Tone Exam: Normal       Sensory Exam:   Intact Normal Sensation to ankle/foot      Stability Testing: No anterolateral or varus instability of the Ankle or Subtalar Joints               No peroneal tendon instability noted      ROM: Normal ROM noted to ankle/foot      Contractures: No Achilles or Gastrocnemius Contractures      Calf tenderness: Absent for calf or gastrocnemius muscle regions       Soft, supple, non tender, non taut lower extremity compartment  Alignment:      NEUTRAL Hindfoot,         none Metatarsus Adductus Metatarsus   Wounds/Abrasions:    None present  Extremities:   No embolic phenomena to the toes          No significant edema to the foot and or toes.         Lower extremities are warm and appear well perfused    DVT: No evidence of DVT seen on examination at this time     No calf swelling, no tenderness to calf muscles  Lymphatic:  No Evidence of Lymphedema  Vascular: Medial Border of Tibia Region: Edema is not present         Pulses: Dorsalis Pedis &  Posterior Tibial Pulses : Palpable yes        Varicosities Lower Limbs :  None  noted  Neuro: Negative bilateral Straight leg raise (seated position)    See Musculoskeletal section for pertinent individual extremity examination    No abnormal hand/wrist, foot/ankle, or facial/neck tremors. Lower Extremity/Ankle/Foot:  Mild antalgic gait, satisfactory weightbearing stance. Left lower extremity/ankle: Full active and passive range of motion intact, nontender, no swelling, ligaments grossly stable. Negative Rivas test, negative ankle squeeze test.    Left foot: There is diffuse tenderness to palpation around the midfoot joints, there is acquired supination to the forefoot with moderate arch, there is a bit of bone spurring around the midfoot joints. Most tender is the first TMT joint and that is where the bone spur present, mild to moderately tender at the second and third TMT joints. Contralateral lower extremity/ankle /foot exam:  Nontender, no swelling ligaments grossly stable. Normal weightbearing stance. Neurovascular exam intact for light touch sensation, capillary refill present, dorsalis pedis pulse palpable, flexion/extension strength 5/5. Imaging:    Reviewed x-rays from PACS dated May 25, 2022. These x-rays are of the left foot AP, lateral and oblique nonweightbearing x-rays, show a very wide forefoot with supination to the forefoot, arthritis significant at the first TMT joint and also around the second and third but the worst is the first TMT joint. There is decreased bone density, no acute fractures or dislocations, no acute abnormalities. An electronic signature was used to authenticate this note.   -- Josafat Davila MD

## 2022-11-16 ENCOUNTER — HOSPITAL ENCOUNTER (OUTPATIENT)
Dept: MAMMOGRAPHY | Age: 67
Discharge: HOME | End: 2022-11-16
Attending: INTERNAL MEDICINE
Payer: MEDICARE

## 2022-11-16 DIAGNOSIS — Z12.31 ENCOUNTER FOR SCREENING MAMMOGRAM FOR MALIGNANT NEOPLASM OF BREAST: ICD-10-CM

## 2022-11-16 PROCEDURE — 77063 BREAST TOMOSYNTHESIS BI: CPT

## 2022-11-21 ENCOUNTER — TRANSCRIBE ORDER (OUTPATIENT)
Dept: MAMMOGRAPHY | Age: 67
End: 2022-11-21

## 2022-11-21 DIAGNOSIS — R92.8 ABNORMALITY OF RIGHT BREAST ON SCREENING MAMMOGRAM: Primary | ICD-10-CM

## 2022-12-01 ENCOUNTER — HOSPITAL ENCOUNTER (OUTPATIENT)
Dept: MAMMOGRAPHY | Age: 67
Discharge: HOME | End: 2022-12-01
Attending: INTERNAL MEDICINE
Payer: MEDICARE

## 2022-12-01 ENCOUNTER — HOSPITAL ENCOUNTER (OUTPATIENT)
Dept: MAMMOGRAPHY | Age: 67
End: 2022-12-01
Attending: INTERNAL MEDICINE
Payer: MEDICARE

## 2022-12-01 DIAGNOSIS — R92.8 ABNORMALITY OF RIGHT BREAST ON SCREENING MAMMOGRAM: ICD-10-CM

## 2022-12-01 PROCEDURE — 77061 BREAST TOMOSYNTHESIS UNI: CPT

## 2022-12-21 ENCOUNTER — OFFICE VISIT (OUTPATIENT)
Dept: ORTHOPEDIC SURGERY | Age: 67
End: 2022-12-21
Payer: MEDICARE

## 2022-12-21 VITALS — HEIGHT: 60 IN | WEIGHT: 165 LBS | BODY MASS INDEX: 32.39 KG/M2

## 2022-12-21 DIAGNOSIS — Z96.641 STATUS POST TOTAL REPLACEMENT OF RIGHT HIP: Primary | ICD-10-CM

## 2022-12-21 PROCEDURE — G8536 NO DOC ELDER MAL SCRN: HCPCS | Performed by: PHYSICIAN ASSISTANT

## 2022-12-21 PROCEDURE — 1123F ACP DISCUSS/DSCN MKR DOCD: CPT | Performed by: PHYSICIAN ASSISTANT

## 2022-12-21 PROCEDURE — G8427 DOCREV CUR MEDS BY ELIG CLIN: HCPCS | Performed by: PHYSICIAN ASSISTANT

## 2022-12-21 PROCEDURE — 3017F COLORECTAL CA SCREEN DOC REV: CPT | Performed by: PHYSICIAN ASSISTANT

## 2022-12-21 PROCEDURE — 1090F PRES/ABSN URINE INCON ASSESS: CPT | Performed by: PHYSICIAN ASSISTANT

## 2022-12-21 PROCEDURE — G8417 CALC BMI ABV UP PARAM F/U: HCPCS | Performed by: PHYSICIAN ASSISTANT

## 2022-12-21 PROCEDURE — G8432 DEP SCR NOT DOC, RNG: HCPCS | Performed by: PHYSICIAN ASSISTANT

## 2022-12-21 PROCEDURE — G8399 PT W/DXA RESULTS DOCUMENT: HCPCS | Performed by: PHYSICIAN ASSISTANT

## 2022-12-21 PROCEDURE — 99213 OFFICE O/P EST LOW 20 MIN: CPT | Performed by: PHYSICIAN ASSISTANT

## 2022-12-21 PROCEDURE — G9899 SCRN MAM PERF RSLTS DOC: HCPCS | Performed by: PHYSICIAN ASSISTANT

## 2022-12-21 PROCEDURE — 1101F PT FALLS ASSESS-DOCD LE1/YR: CPT | Performed by: PHYSICIAN ASSISTANT

## 2022-12-21 NOTE — PROGRESS NOTES
Michelle Pace (: 1955) is a 69 y.o. female,  here for evaluation of the following chief complaint(s):  Hip Pain (Hx of bilateral hip replacements - pain present for several weeks/No injury or fall reported )       SUBJECTIVE/OBJECTIVE:    Michelle Pace (: 1955) is a 69 y.o. female. Right Total Hip Arthroplasty Anterior Approach - Right 2022       Patient follows up today approximately 1 year out from a right total hip arthroplasty. Patient  states she is overall doing well following this procedure. She did have a slight setback approximately 2 weeks ago where she had to stand for 2 hours in a row and thinks that this aggravated the hip. She feels as though that pain is resolving now. Patient is back to doing most every day activities that she would like to be doing. Patient's range of motion is to her satisfaction. Patient continues to perform home exercises to maintain strength. Patient is no longer needing to take any medication for pain. Allergies   Allergen Reactions    Ciprofloxacin Rash    Oxycontin [Oxycodone] Shortness of Breath and Itching    Sulfa (Sulfonamide Antibiotics) Rash    Zocor [Simvastatin] Myalgia       Current Outpatient Medications   Medication Sig    rivaroxaban (Xarelto) 20 mg tab tablet Take 1 Tablet by mouth daily. Indications: deep vein thrombosis prevention, Factor V Leiden    rivaroxaban (XARELTO) 20 mg tab tablet Take 1 Tablet by mouth daily (with breakfast). Indications: deep vein thrombosis prevention, post op DVT prevention    CALCIUM PO Take 50,000 Units by mouth every seven (7) days. THURSDAY    gabapentin (NEURONTIN) 100 mg capsule Take 200 mg by mouth four (4) times daily. montelukast (SINGULAIR) 10 mg tablet Take 10 mg by mouth every evening. amantadine HCL (SYMMETREL) 100 mg capsule Take 100 mg by mouth every evening. meclizine (ANTIVERT) 25 mg tablet Take 25 mg by mouth three (3) times daily as needed for Dizziness.     OTHER as needed. CANNABIS HEMP HOT CREAM    polyethylene glycol 400 (Blink Gel Tears) 0.25 % drpg Apply  to eye as needed. Menthol (Stopain) 8 % spry by Apply Externally route as needed. ROLL ON    carbidopa-levodopa (SINEMET)  mg per tablet Take 1 Tab by mouth four (4) times daily. turmeric root extract 500 mg cap Take  by mouth. rOPINIRole (REQUIP XL) 6 mg Tb24 Take  by mouth every evening. Nebulizer & Compressor machine 1 Each by Does Not Apply route four (4) times daily as needed. levalbuterol (XOPENEX) 0.63 mg/3 mL nebu 3 mL by Nebulization route every six (6) hours as needed. rasagiline (AZILECT) 1 mg tablet Take 1 Tab by mouth daily. (Patient taking differently: Take 1 mg by mouth every evening.)    atorvastatin (LIPITOR) 10 mg tablet Take  by mouth every evening. SODIUM CHLORIDE/SODIUM BICARB (NEILMED SINUS RINSE COMPLETE NA) by Nasal route. polyethylene glycol (MIRALAX) 17 gram packet Take 17 g by mouth as needed. ergocalciferol (VITAMIN D) 50,000 unit capsule Take 50,000 Units by mouth every seven (7) days. lansoprazole (PREVACID SOLUTAB) 30 mg disintegrating tablet Take  by mouth Every morning. hydrochlorothiazide (HYDRODIURIL) 25 mg tablet Take 25 mg by mouth every evening. budesonide (PULMICORT) 180 mcg/Inhalation AePB inhaler Take 2 Puffs by inhalation two (2) times a day. No current facility-administered medications for this visit.        Social History     Socioeconomic History    Marital status:      Spouse name: Not on file    Number of children: Not on file    Years of education: Not on file    Highest education level: Not on file   Occupational History    Not on file   Tobacco Use    Smoking status: Never    Smokeless tobacco: Never   Vaping Use    Vaping Use: Never used   Substance and Sexual Activity    Alcohol use: Yes     Comment: 7 GLASSES/WEEKLY    Drug use: No    Sexual activity: Not on file   Other Topics Concern    Not on file   Social History Narrative    Not on file     Social Determinants of Health     Financial Resource Strain: Not on file   Food Insecurity: Not on file   Transportation Needs: Not on file   Physical Activity: Not on file   Stress: Not on file   Social Connections: Not on file   Intimate Partner Violence: Not on file   Housing Stability: Not on file       Past Surgical History:   Procedure Laterality Date    HX APPENDECTOMY  1961    HX GI      COLONSCOPY/EGD    HX HIP REPLACEMENT Left 07/2011    HX ORTHOPAEDIC      left hip replacement    HX TONSILLECTOMY         Family History   Problem Relation Age of Onset    Dementia Mother         AD Lewy Bodies    Hypertension Father     Asthma Father     Other Father         CONFUSION/ B/P DROPS    Cancer Paternal Aunt         STOMACH CA    Alcohol abuse Maternal Grandfather     Diabetes Paternal Grandfather     Heart Disease Paternal Grandfather         MI    Stroke Paternal Grandfather     Cancer Paternal Aunt         LEUKEMIA    Anesth Problems Neg Hx         OB History    No obstetric history on file. REVIEW OF SYSTEMS:    Patient denies any recent fever, chills, nausea, vomiting, chest pain, or shortness of breath. Vitals:    Ht 5' (1.524 m)   Wt 165 lb (74.8 kg)   BMI 32.22 kg/m²    Body mass index is 32.22 kg/m². PHYSICAL EXAM:    The patient is alert and oriented x3 and in no acute distress. The postoperative wound is well-healed without erythema or drainage. There is full range of motion in the hip joint. The hip is stable. There is no calf tenderness to palpation. Distal motor and sensation is intact. IMAGING:    Results from Appointment encounter on 12/21/22    XR HIPS BI W OR WO AP PELV    Narrative  AP pelvis and frogleg view digital radiographs of bilateral hip obtained in the office today were reviewed and demonstrate anatomic alignment of components with no evidence of hardware loosening or subsidence.         Orders Placed This Encounter    XR HIPS BI W OR WO AP PELV     4C - has replacements     Standing Status:   Future     Number of Occurrences:   1     Standing Expiration Date:   12/22/2023          ASSESSMENT/PLAN:      1. Status post total replacement of right hip  -     XR HIPS BI W OR WO AP PELV; Future        Below is the assessment and plan developed based on review of pertinent history, physical exam, labs, studies, and medications. 69 y.o. female approximately 1 year status post right total hip arthroplasty. I am happy with her progress so far following this procedure. I have reviewed her x-rays with her in detail today and answered her questions to her satisfaction. I have reassured her that based on her physical exam, x-rays, and improving symptoms, everything appears to be healing just fine. I emphasized the importance on continuing to do a home exercise program to maintain range of motion in the joint and strength in the leg. Patient will follow up with us as needed. No follow-ups on file. Dr. Raudel Simms was available for immediate consultation as needed. An electronic signature was used to authenticate this note.   -- Daniel Alexandre PA-C

## 2023-04-22 DIAGNOSIS — R92.8 ABNORMALITY OF RIGHT BREAST ON SCREENING MAMMOGRAM: Primary | ICD-10-CM

## 2023-10-15 NOTE — PROGRESS NOTES
Problem: Mobility Impaired (Adult and Pediatric)  Goal: *Acute Goals and Plan of Care (Insert Text)  Description: FUNCTIONAL STATUS PRIOR TO ADMISSION: Patient was modified independent using a single point cane for functional mobility - but more recently using RW secondary to leg weakness and pain, however over past month using RW. Pt also reporting 4 falls over last month associated with vertigo - recently started Meclizine . HOME SUPPORT PRIOR TO ADMISSION: The patient lived with  - however, daughter reporting limited support from . Daughter and [id number] trying to help out. Physical Therapy Goals  Initiated 1/18/2022    1. Patient will move from supine to sit and sit to supine , scoot up and down, and roll side to side in bed with modified independence within 4 days. 2. Patient will perform sit to stand with modified independence within 4 days. 3. Patient will ambulate with modified independence for > 100 feet with the least restrictive device within 4 days. 4. Patient will ascend/descend 4 stairs with one handrail(s) with minimal assistance/contact guard assist within 4 days. 5. Patient will perform THR home exercise program per protocol with supervision/set-up within 4 days.      PHYSICAL THERAPY EVALUATION  Patient: Aung Viveros (4 y.o. female)  Date: 1/18/2022  Primary Diagnosis: Primary osteoarthritis of right hip [M16.11]  Primary localized osteoarthritis of right hip [M16.11]  Procedure(s) (LRB):  RIGHT TOTAL HIP ARTHROPLASTY ANTERIOR APPROACH (Right) Day of Surgery   Precautions:   Fall,WBAT    ASSESSMENT  Based on the objective data described below, the patient presents POD #0 Right ALF with pain right hip, decreased AROM/strength and function right leg, decline in functional mobility over past month - requiring use of wheelchair, recent hx of vertigo started on meclizine, hx of Parkinson's, decreased activity tolerance, fear associated with falling, and impaired standing balance/gait with use of RW. Pt was able to mobilize with assist x 2 to reassure pt - and provide safety. Gait characteristic of Parkinson's with shuffling small sets and also rigidity noted with bed mobility. Secondary to recent falls, decline in function to use of w/c, and Parkinson's - pt may need inpatient rehab. Current Level of Function Impacting Discharge (mobility/balance): Mod assist supine to/from sit; Min assist x 2 for sit to stand and amb few steps with RW    Functional Outcome Measure: The patient scored 50/100 on the Barthel Index outcome measure. Other factors to consider for discharge: PLOF up until recently pt was independent with cane/safe; support in home is limited     Patient will benefit from skilled therapy intervention to address the above noted impairments. PLAN :  Recommendations and Planned Interventions: bed mobility training, transfer training, gait training, therapeutic exercises, patient and family training/education, and therapeutic activities      Frequency/Duration: Patient will be followed by physical therapy:  twice daily to address goals. Recommendation for discharge: (in order for the patient to meet his/her long term goals)  To be determined: dependent on progress IPR vs. Home health PT    This discharge recommendation:  Has not yet been discussed the attending provider and/or case management    IF patient discharges home will need the following DME: patient owns DME required for discharge         SUBJECTIVE:   Patient stated don't let me fall.     OBJECTIVE DATA SUMMARY:   HISTORY:    Past Medical History:   Diagnosis Date    Arthritis     Asthma     [facility] 2006    MELANOMA    Chronic pain     BACK    Constipation     Embolism - blood clot 2011    DVT    Factor 5 Leiden mutation, heterozygous ([facility]     Gallbladder polyp     Gastro-oesophageal reflux disease with hiatal hernia     GERD (gastroesophageal reflux disease)     Hypertension     Liver Unknown if ever smoked disease     ELEVATED LIVER ENZYMES 2011    [facility] ([facility]     Osteoporosis     Parkinson's disease     Seizures ([facility]     FEBRILE SEIZURES AS A CHILD    Spinal stenosis of lumbar region     Steroid-induced avascular necrosis of hip (HCC)      Past Surgical History:   Procedure Laterality Date    HX APPENDECTOMY  1961    HX GI      COLONSCOPY/EGD    HX HIP REPLACEMENT Left 07/2011    HX ORTHOPAEDIC      left hip replacement    HX TONSILLECTOMY         Personal factors and/or comorbidities impacting plan of care: as above    Home Situation  Home Environment: Private residence  # Steps to Enter: 3  Rails to Enter: Yes  Hand Rails : Bilateral  Wheelchair Ramp: No  One/Two Story Residence: Two story, live on 1st floor  Lift Chair Available: No  Living Alone: No  Support Systems: Spouse/Significant Other  Patient Expects to be Discharged to[de-identified] House  Current DME Used/Available at Home: Cane, straight,Walker, rolling,Safety frame toliet,Shower chair,Wheelchair  Tub or Shower Type: Shower    EXAMINATION/PRESENTATION/DECISION MAKING:   Critical Behavior:  Neurologic State: Alert  Orientation Level: Oriented X4  Cognition: Appropriate decision making,Appropriate safety awareness  Safety/Judgement: Awareness of environment    Range Of Motion:  AROM: Within functional limits (except right hip)                       Strength:    Strength: Within functional limits (except right hip)                    Tone & Sensation:   Tone: Abnormal (rigidity - Parkinson's)              Sensation: Intact               Coordination:  Coordination: Generally decreased, functional  Functional Mobility:  Bed Mobility:     Supine to Sit: Moderate assistance;Assist x1;Additional time;Bed Modified  Sit to Supine: Moderate assistance;Assist x1  Scooting: Moderate assistance;Assist x1  Transfers:  Sit to Stand: Minimum assistance;Assist x2;Adaptive equipment; Additional time  Stand to Sit: Minimum assistance;Assist x1  Stand Pivot Transfers: Minimum assistance;Assist x2;Adaptive equipment; Additional time                    Balance:   Sitting: Intact; Without support  Standing: Impaired; With support  Standing - Static: Good;Constant support  Standing - Dynamic : Fair;Constant support  Ambulation/Gait Training:  Distance (ft): 10 Feet (ft)  Assistive Device: Walker, rolling;Gait belt  Ambulation - Level of Assistance: Contact guard assistance;Minimal assistance;Assist x1 (verbal cues for heel toe to increase stride)        Gait Abnormalities: Decreased step clearance;Shuffling gait  Right Side Weight Bearing: As tolerated  Left Side Weight Bearing: Full  Base of Support: Center of gravity altered;Shift to left  Stance: Right decreased  Speed/Magdalene: Shuffled; Slow  Step Length: Right shortened;Left shortened  Swing Pattern: Right asymmetrical;Left asymmetrical       Therapeutic Exercises:   Pt instructed in ankle pumps and use of incentive spirometer - to utilize once hr when awake x 10 breaths. Functional Measure:  Barthel Index:    Bathin  Bladder: 5  Bowels: 10  Groomin  Dressin  Feeding: 10  Mobility: 0  Stairs: 0  Toilet Use: 5  Transfer (Bed to Chair and Back): 10  Total: 50/100       The Barthel ADL Index: Guidelines  1. The index should be used as a record of what a patient does, not as a record of what a patient could do. 2. The main aim is to establish degree of independence from any help, physical or verbal, however minor and for whatever reason. 3. The need for supervision renders the patient not independent. 4. A patient's performance should be established using the best available evidence. Asking the patient, friends/relatives and nurses are the usual sources, but direct observation and common sense are also important. However direct testing is not needed. 5. Usually the patient's performance over the preceding 24-48 hours is important, but occasionally longer periods will be relevant.   6. Middle categories imply that the patient supplies over 50 per cent of the effort. 7. Use of aids to be independent is allowed. Score Interpretation (from [address] [facility])    Independent   60-79 Minimally independent   40-59 Partially dependent   20-39 Very dependent   <20 Totally dependent     -Marvin Garcia., Barthel, D.W. (1965). Functional evaluation: the Barthel Index. [address] ([address] (1997). The Barthel activities of daily living index: self-reporting versus actual performance in the old (> or = 75 years). Journal of [address] [place] [address] 45(7), [facility], J...F, Leland Cowden., [facility]3]. (1999). Measuring the change in disability after inpatient rehabilitation; comparison of the responsiveness of the Barthel Index and Functional Sharp Measure. Journal of Neurology, Neurosurgery, and Psychiatry, 66(4), 510-659. Stefanie Sesay, N.J.A, MARIA FERNANDA Mancilla, & Lidia Anne M.A. (2004) Assessment of post-stroke quality of life in cost-effectiveness studies: The usefulness of the Barthel Index and the EuroQoL-5D.  Quality of Life Research, 15, 471-36           Physical Therapy Evaluation Charge Determination   History Examination Presentation Decision-Making   LOW Complexity : Zero comorbidities / personal factors that will impact the outcome / POC LOW Complexity : 1-2 Standardized tests and measures addressing body structure, function, activity limitation and / or participation in recreation  LOW Complexity : Stable, uncomplicated  LOW Complexity : FOTO score of       Based on the above components, the patient evaluation is determined to be of the following complexity level: LOW     Pain Rating:  Right hip 3/10    Activity Tolerance:   Fair - able to bear weight and amb short distance, shuffling gait characteristic of Parkinson's    After treatment patient left in no apparent distress:   Supine in bed, Call bell within reach, Caregiver / family present, and ice right hip    COMMUNICATION/EDUCATION:   The patients plan of care was discussed with: Registered nurse. Fall prevention education was provided and the patient/caregiver indicated understanding., Patient/family have participated as able in goal setting and plan of care. , and Patient/family agree to work toward stated goals and plan of care.     Thank you for this referral.  Santi James, PT   Time Calculation: 25 mins

## 2023-12-12 ENCOUNTER — HOSPITAL ENCOUNTER (OUTPATIENT)
Facility: HOSPITAL | Age: 68
Discharge: HOME | End: 2023-12-15
Payer: MEDICARE

## 2023-12-12 DIAGNOSIS — Z12.31 ENCOUNTER FOR SCREENING MAMMOGRAM FOR MALIGNANT NEOPLASM OF BREAST: ICD-10-CM

## 2023-12-12 PROCEDURE — 77063 BREAST TOMOSYNTHESIS BI: CPT

## 2024-08-02 ENCOUNTER — APPOINTMENT (OUTPATIENT)
Facility: HOSPITAL | Age: 69
End: 2024-08-02
Payer: MEDICARE

## 2024-08-02 ENCOUNTER — HOSPITAL ENCOUNTER (EMERGENCY)
Facility: HOSPITAL | Age: 69
Discharge: HOME | End: 2024-08-02
Attending: EMERGENCY MEDICINE
Payer: MEDICARE

## 2024-08-02 VITALS
OXYGEN SATURATION: 98 % | SYSTOLIC BLOOD PRESSURE: 151 MMHG | DIASTOLIC BLOOD PRESSURE: 85 MMHG | RESPIRATION RATE: 19 BRPM | HEART RATE: 86 BPM | TEMPERATURE: 98.1 F

## 2024-08-02 DIAGNOSIS — R06.02 SOB (SHORTNESS OF BREATH): Primary | ICD-10-CM

## 2024-08-02 LAB
ALBUMIN SERPL-MCNC: 3.7 G/DL (ref 3.5–5)
ALBUMIN/GLOB SERPL: 1.2 (ref 1.1–2.2)
ALP SERPL-CCNC: 120 U/L (ref 45–117)
ALT SERPL-CCNC: <6 U/L (ref 12–78)
ANION GAP SERPL CALC-SCNC: 10 MMOL/L (ref 5–15)
AST SERPL-CCNC: 17 U/L (ref 15–37)
BASOPHILS # BLD: 0 K/UL (ref 0–0.1)
BASOPHILS NFR BLD: 0 % (ref 0–1)
BILIRUB SERPL-MCNC: 0.5 MG/DL (ref 0.2–1)
BUN SERPL-MCNC: 17 MG/DL (ref 6–20)
BUN/CREAT SERPL: 18 (ref 12–20)
CALCIUM SERPL-MCNC: 8.9 MG/DL (ref 8.5–10.1)
CHLORIDE SERPL-SCNC: 99 MMOL/L (ref 97–108)
CO2 SERPL-SCNC: 30 MMOL/L (ref 21–32)
CREAT SERPL-MCNC: 0.93 MG/DL (ref 0.55–1.02)
DIFFERENTIAL METHOD BLD: ABNORMAL
EKG ATRIAL RATE: 86 BPM
EKG DIAGNOSIS: NORMAL
EKG P AXIS: 56 DEGREES
EKG P-R INTERVAL: 152 MS
EKG Q-T INTERVAL: 380 MS
EKG QRS DURATION: 102 MS
EKG QTC CALCULATION (BAZETT): 454 MS
EKG R AXIS: -44 DEGREES
EKG T AXIS: 45 DEGREES
EKG VENTRICULAR RATE: 86 BPM
EOSINOPHIL # BLD: 0.2 K/UL (ref 0–0.4)
EOSINOPHIL NFR BLD: 2 % (ref 0–7)
ERYTHROCYTE [DISTWIDTH] IN BLOOD BY AUTOMATED COUNT: 12.9 % (ref 11.5–14.5)
GLOBULIN SER CALC-MCNC: 3.2 G/DL (ref 2–4)
GLUCOSE SERPL-MCNC: 109 MG/DL (ref 65–100)
HCT VFR BLD AUTO: 39.7 % (ref 35–47)
HGB BLD-MCNC: 12.9 G/DL (ref 11.5–16)
IMM GRANULOCYTES # BLD AUTO: 0 K/UL (ref 0–0.04)
IMM GRANULOCYTES NFR BLD AUTO: 1 % (ref 0–0.5)
LYMPHOCYTES # BLD: 0.8 K/UL (ref 0.8–3.5)
LYMPHOCYTES NFR BLD: 10 % (ref 12–49)
MAGNESIUM SERPL-MCNC: 2.3 MG/DL (ref 1.6–2.4)
MCH RBC QN AUTO: 30.1 PG (ref 26–34)
MCHC RBC AUTO-ENTMCNC: 32.5 G/DL (ref 30–36.5)
MCV RBC AUTO: 92.5 FL (ref 80–99)
MONOCYTES # BLD: 0.6 K/UL (ref 0–1)
MONOCYTES NFR BLD: 8 % (ref 5–13)
NEUTS SEG # BLD: 6.3 K/UL (ref 1.8–8)
NEUTS SEG NFR BLD: 79 % (ref 32–75)
NRBC # BLD: 0 K/UL (ref 0–0.01)
NRBC BLD-RTO: 0 PER 100 WBC
NT PRO BNP: 79 PG/ML (ref 0–125)
PLATELET # BLD AUTO: 177 K/UL (ref 150–400)
PMV BLD AUTO: 10.8 FL (ref 8.9–12.9)
POTASSIUM SERPL-SCNC: 3.3 MMOL/L (ref 3.5–5.1)
PROT SERPL-MCNC: 6.9 G/DL (ref 6.4–8.2)
RBC # BLD AUTO: 4.29 M/UL (ref 3.8–5.2)
SODIUM SERPL-SCNC: 139 MMOL/L (ref 136–145)
TROPONIN I SERPL HS-MCNC: 6 NG/L (ref 0–51)
WBC # BLD AUTO: 7.9 K/UL (ref 3.6–11)

## 2024-08-02 PROCEDURE — 99285 EMERGENCY DEPT VISIT HI MDM: CPT

## 2024-08-02 PROCEDURE — 93010 ELECTROCARDIOGRAM REPORT: CPT | Performed by: INTERNAL MEDICINE

## 2024-08-02 PROCEDURE — 83880 ASSAY OF NATRIURETIC PEPTIDE: CPT

## 2024-08-02 PROCEDURE — 6360000004 HC RX CONTRAST MEDICATION: Performed by: EMERGENCY MEDICINE

## 2024-08-02 PROCEDURE — 85025 COMPLETE CBC W/AUTO DIFF WBC: CPT

## 2024-08-02 PROCEDURE — 83735 ASSAY OF MAGNESIUM: CPT

## 2024-08-02 PROCEDURE — 80053 COMPREHEN METABOLIC PANEL: CPT

## 2024-08-02 PROCEDURE — 71275 CT ANGIOGRAPHY CHEST: CPT

## 2024-08-02 PROCEDURE — 93005 ELECTROCARDIOGRAM TRACING: CPT | Performed by: EMERGENCY MEDICINE

## 2024-08-02 PROCEDURE — 36415 COLL VENOUS BLD VENIPUNCTURE: CPT

## 2024-08-02 PROCEDURE — 84484 ASSAY OF TROPONIN QUANT: CPT

## 2024-08-02 RX ADMIN — IOPAMIDOL 100 ML: 755 INJECTION, SOLUTION INTRAVENOUS at 13:59

## 2024-08-02 ASSESSMENT — PAIN - FUNCTIONAL ASSESSMENT: PAIN_FUNCTIONAL_ASSESSMENT: NONE - DENIES PAIN

## 2024-08-02 NOTE — ED PROVIDER NOTES
[facility] EMERGENCY DEPT  EMERGENCY DEPARTMENT ENCOUNTER      Patient Name: Diane Stewart  MRN: [id number]  Birthdate 1955  Date of Evaluation: 8/2/2024  Physician: José Manuel Abbott MD    CHIEF COMPLAINT       Chief Complaint   Patient presents with    Shortness of Breath       HISTORY OF PRESENT ILLNESS   (Location/Symptom, Timing/Onset, Context/Setting, Quality, Duration, Modifying Factors, Severity)   Diane Stewart, 68 y.o., female     68-year-old female with a history of asthma, factor V, hypertension, Parkinson's disease presents with a chief complaint of shortness of breath.  Patient states that shortness of breath started yesterday.  She denies cough or fever.  She has not had any chest pain.  She has had a previous PE which was attributed to a hip replacement 10 years ago.  She is not on any blood thinners.          Nursing Notes were reviewed.    REVIEW OF SYSTEMS    (Not required)   Review of Systems    Except as noted above the remainder of the review of systems was reviewed and negative.     PAST MEDICAL HISTORY     Past Medical History:   Diagnosis Date    Arthritis     Asthma     Cancer (HCC) 2006    MELANOMA    Chronic pain     BACK    Constipation     Factor 5 Leiden mutation, heterozygous (HCC)     Gallbladder polyp     GERD (gastroesophageal reflux disease)     Hypertension     Liver disease     ELEVATED LIVER ENZYMES 2011    Melanoma (HCC)     Osteoporosis     Parkinson's disease (HCC)     Seizures (HCC)     FEBRILE SEIZURES AS A CHILD    Spinal stenosis of lumbar region     Steroid-induced avascular necrosis of hip (HCC)        SURGICAL HISTORY       Past Surgical History:   Procedure Laterality Date    APPENDECTOMY  1961    GI      COLONSCOPY/EGD    ORTHOPEDIC SURGERY      left hip replacement    TONSILLECTOMY      TOTAL HIP ARTHROPLASTY Left 07/2011       CURRENT MEDICATIONS       Previous Medications    AMANTADINE (SYMMETREL) 100 MG CAPSULE    Take 1 capsule by mouth every evening    ATORVASTATIN

## 2024-08-02 NOTE — ED TRIAGE NOTES
Pt presents to ED with c/o dyspnea for 24 hours. Pt sent by her primary care MD for further evaluation. Pt denies fever or cough but states \"not feeling well\" this week. Pt denies fever, cough or vomiting.

## 2025-02-05 ENCOUNTER — APPOINTMENT (OUTPATIENT)
Facility: HOSPITAL | Age: 70
End: 2025-02-05
Payer: MEDICARE

## 2025-02-05 ENCOUNTER — HOSPITAL ENCOUNTER (EMERGENCY)
Facility: HOSPITAL | Age: 70
Discharge: HOME | End: 2025-02-06
Attending: EMERGENCY MEDICINE
Payer: MEDICARE

## 2025-02-05 DIAGNOSIS — N30.00 ACUTE CYSTITIS WITHOUT HEMATURIA: ICD-10-CM

## 2025-02-05 DIAGNOSIS — E87.6 HYPOKALEMIA: Primary | ICD-10-CM

## 2025-02-05 DIAGNOSIS — R41.0 EPISODE OF CONFUSION: ICD-10-CM

## 2025-02-05 LAB
ALBUMIN SERPL-MCNC: 3.5 G/DL (ref 3.5–5)
ALBUMIN/GLOB SERPL: 1 (ref 1.1–2.2)
ALP SERPL-CCNC: 109 U/L (ref 45–117)
ALT SERPL-CCNC: <6 U/L (ref 12–78)
AMPHET UR QL SCN: NEGATIVE
ANION GAP SERPL CALC-SCNC: 10 MMOL/L (ref 2–12)
APPEARANCE UR: ABNORMAL
AST SERPL-CCNC: 11 U/L (ref 15–37)
BACTERIA URNS QL MICRO: ABNORMAL /HPF
BARBITURATES UR QL SCN: NEGATIVE
BASOPHILS # BLD: 0.02 K/UL (ref 0–0.1)
BASOPHILS NFR BLD: 0.2 % (ref 0–1)
BENZODIAZ UR QL: NEGATIVE
BILIRUB SERPL-MCNC: 0.8 MG/DL (ref 0.2–1)
BILIRUB UR QL: NEGATIVE
BUN SERPL-MCNC: 20 MG/DL (ref 6–20)
BUN/CREAT SERPL: 19 (ref 12–20)
CALCIUM SERPL-MCNC: 8.9 MG/DL (ref 8.5–10.1)
CANNABINOIDS UR QL SCN: NEGATIVE
CHLORIDE SERPL-SCNC: 102 MMOL/L (ref 97–108)
CO2 SERPL-SCNC: 30 MMOL/L (ref 21–32)
COCAINE UR QL SCN: NEGATIVE
COLOR UR: ABNORMAL
CREAT SERPL-MCNC: 1.08 MG/DL (ref 0.55–1.02)
DIFFERENTIAL METHOD BLD: ABNORMAL
EOSINOPHIL # BLD: 0.02 K/UL (ref 0–0.4)
EOSINOPHIL NFR BLD: 0.2 % (ref 0–7)
EPITH CASTS URNS QL MICRO: ABNORMAL /LPF
ERYTHROCYTE [DISTWIDTH] IN BLOOD BY AUTOMATED COUNT: 12.9 % (ref 11.5–14.5)
ETHANOL SERPL-MCNC: <10 MG/DL (ref 0–0.08)
FLUAV RNA SPEC QL NAA+PROBE: NOT DETECTED
FLUBV RNA SPEC QL NAA+PROBE: NOT DETECTED
GLOBULIN SER CALC-MCNC: 3.5 G/DL (ref 2–4)
GLUCOSE BLD STRIP.AUTO-MCNC: 109 MG/DL (ref 65–117)
GLUCOSE SERPL-MCNC: 123 MG/DL (ref 65–100)
GLUCOSE UR STRIP.AUTO-MCNC: NEGATIVE MG/DL
HCT VFR BLD AUTO: 38 % (ref 35–47)
HGB BLD-MCNC: 12.9 G/DL (ref 11.5–16)
HGB UR QL STRIP: ABNORMAL
IMM GRANULOCYTES # BLD AUTO: 0.07 K/UL (ref 0–0.04)
IMM GRANULOCYTES NFR BLD AUTO: 0.6 % (ref 0–0.5)
KETONES UR QL STRIP.AUTO: NEGATIVE MG/DL
LEUKOCYTE ESTERASE UR QL STRIP.AUTO: ABNORMAL
LYMPHOCYTES # BLD: 0.81 K/UL (ref 0.8–3.5)
LYMPHOCYTES NFR BLD: 6.6 % (ref 12–49)
Lab: ABNORMAL
MCH RBC QN AUTO: 31 PG (ref 26–34)
MCHC RBC AUTO-ENTMCNC: 33.9 G/DL (ref 30–36.5)
MCV RBC AUTO: 91.3 FL (ref 80–99)
METHADONE UR QL: NEGATIVE
MONOCYTES # BLD: 0.85 K/UL (ref 0–1)
MONOCYTES NFR BLD: 6.9 % (ref 5–13)
NEUTS SEG # BLD: 10.59 K/UL (ref 1.8–8)
NEUTS SEG NFR BLD: 85.5 % (ref 32–75)
NITRITE UR QL STRIP.AUTO: NEGATIVE
NRBC # BLD: 0 K/UL (ref 0–0.01)
NRBC BLD-RTO: 0 PER 100 WBC
OPIATES UR QL: POSITIVE
PCP UR QL: NEGATIVE
PH UR STRIP: 6 (ref 5–8)
PLATELET # BLD AUTO: 206 K/UL (ref 150–400)
PMV BLD AUTO: 10.8 FL (ref 8.9–12.9)
POTASSIUM SERPL-SCNC: 3 MMOL/L (ref 3.5–5.1)
PROT SERPL-MCNC: 7 G/DL (ref 6.4–8.2)
PROT UR STRIP-MCNC: 30 MG/DL
RBC # BLD AUTO: 4.16 M/UL (ref 3.8–5.2)
RBC #/AREA URNS HPF: ABNORMAL /HPF (ref 0–5)
SARS-COV-2 RNA RESP QL NAA+PROBE: NOT DETECTED
SERVICE CMNT-IMP: NORMAL
SODIUM SERPL-SCNC: 142 MMOL/L (ref 136–145)
SOURCE: NORMAL
SP GR UR REFRACTOMETRY: 1.02 (ref 1–1.03)
TROPONIN I SERPL HS-MCNC: 8 NG/L (ref 0–51)
UROBILINOGEN UR QL STRIP.AUTO: 0.2 EU/DL (ref 0.2–1)
WBC # BLD AUTO: 12.4 K/UL (ref 3.6–11)
WBC URNS QL MICRO: ABNORMAL /HPF (ref 0–4)

## 2025-02-05 PROCEDURE — 93005 ELECTROCARDIOGRAM TRACING: CPT | Performed by: EMERGENCY MEDICINE

## 2025-02-05 PROCEDURE — 80307 DRUG TEST PRSMV CHEM ANLYZR: CPT

## 2025-02-05 PROCEDURE — 84484 ASSAY OF TROPONIN QUANT: CPT

## 2025-02-05 PROCEDURE — 80053 COMPREHEN METABOLIC PANEL: CPT

## 2025-02-05 PROCEDURE — 99284 EMERGENCY DEPT VISIT MOD MDM: CPT

## 2025-02-05 PROCEDURE — 81001 URINALYSIS AUTO W/SCOPE: CPT

## 2025-02-05 PROCEDURE — 36415 COLL VENOUS BLD VENIPUNCTURE: CPT

## 2025-02-05 PROCEDURE — 6370000000 HC RX 637 (ALT 250 FOR IP): Performed by: EMERGENCY MEDICINE

## 2025-02-05 PROCEDURE — 70450 CT HEAD/BRAIN W/O DYE: CPT

## 2025-02-05 PROCEDURE — 85025 COMPLETE CBC W/AUTO DIFF WBC: CPT

## 2025-02-05 PROCEDURE — 82962 GLUCOSE BLOOD TEST: CPT

## 2025-02-05 PROCEDURE — 87636 SARSCOV2 & INF A&B AMP PRB: CPT

## 2025-02-05 PROCEDURE — 82077 ASSAY SPEC XCP UR&BREATH IA: CPT

## 2025-02-05 RX ORDER — 0.9 % SODIUM CHLORIDE 0.9 %
1000 INTRAVENOUS SOLUTION INTRAVENOUS ONCE
Status: DISCONTINUED | OUTPATIENT
Start: 2025-02-05 | End: 2025-02-06 | Stop reason: HOSPADM

## 2025-02-05 RX ORDER — POTASSIUM CHLORIDE 750 MG/1
20 TABLET, EXTENDED RELEASE ORAL ONCE
Status: COMPLETED | OUTPATIENT
Start: 2025-02-05 | End: 2025-02-05

## 2025-02-05 RX ORDER — CEPHALEXIN 500 MG/1
500 CAPSULE ORAL 3 TIMES DAILY
Qty: 21 CAPSULE | Refills: 0 | Status: SHIPPED | OUTPATIENT
Start: 2025-02-05 | End: 2025-02-12

## 2025-02-05 RX ADMIN — POTASSIUM CHLORIDE 20 MEQ: 750 TABLET, EXTENDED RELEASE ORAL at 21:22

## 2025-02-05 ASSESSMENT — ENCOUNTER SYMPTOMS
ABDOMINAL PAIN: 0
VOMITING: 0
NAUSEA: 0
SHORTNESS OF BREATH: 0
COUGH: 0
WHEEZING: 0
DIARRHEA: 1

## 2025-02-05 ASSESSMENT — LIFESTYLE VARIABLES: HOW OFTEN DO YOU HAVE A DRINK CONTAINING ALCOHOL: NEVER

## 2025-02-05 ASSESSMENT — PAIN SCALES - GENERAL: PAINLEVEL_OUTOF10: 0

## 2025-02-06 VITALS
HEIGHT: 60 IN | SYSTOLIC BLOOD PRESSURE: 176 MMHG | WEIGHT: 160 LBS | TEMPERATURE: 99.2 F | DIASTOLIC BLOOD PRESSURE: 84 MMHG | HEART RATE: 101 BPM | BODY MASS INDEX: 31.41 KG/M2 | RESPIRATION RATE: 25 BRPM | OXYGEN SATURATION: 98 %

## 2025-02-06 LAB
EKG ATRIAL RATE: 99 BPM
EKG DIAGNOSIS: NORMAL
EKG P AXIS: 46 DEGREES
EKG P-R INTERVAL: 144 MS
EKG Q-T INTERVAL: 348 MS
EKG QRS DURATION: 100 MS
EKG QTC CALCULATION (BAZETT): 446 MS
EKG R AXIS: -31 DEGREES
EKG T AXIS: 82 DEGREES
EKG VENTRICULAR RATE: 99 BPM

## 2025-02-06 NOTE — ED NOTES
Received signout on patient.  UA with possible UTI.  Will start on Keflex.  Patient back to baseline.  Will discharge per plan.  MD Julio Cesar Valverde Joseph, MD  02/05/25 [id number]

## 2025-02-06 NOTE — ED NOTES
The patient was discharged home by Dr. knight and isaiah Kahn in stable condition, accompanied by daughter. The patient is alert and oriented, is in no respiratory distress and has vital signs within normal limits . The patient's diagnosis, condition and treatment were explained to patient and daughter. The patient/daughter expressed understanding. No prescriptions given to pt. No work/school note given to pt. A discharge plan has been developed. A  was not involved in the process. Aftercare instructions were given to the daughter. Pt's saline lock removed without complications.

## 2025-02-06 NOTE — ED NOTES
Pt has not been able to provide a urine sample. Pt states that she has anxiety and wants to go home. Dr. Kurtz advised.

## 2025-02-06 NOTE — ED TRIAGE NOTES
Patient presents to treatment area via wheelchair. Patient states she was constipated this morning due to medication regimen. States she took two laxatives at that time. States she became confused and was \"doing things I wasn't sure what I was doing\". History of Parkinsons. Patient currently answering all questions appropriately. Patient states \"I have Factor V\".

## 2025-02-06 NOTE — ED PROVIDER NOTES
[facility] EMERGENCY DEPARTMENT  EMERGENCY DEPARTMENT ENCOUNTER      Pt Name: Diane Stewart  MRN: [id number]  Birthdate 1955  Date of evaluation: 2025  Provider: Eleanor Kurtz MD    CHIEF COMPLAINT       Chief Complaint   Patient presents with    Altered Mental Status         HISTORY OF PRESENT ILLNESS    HPI    Diane Stewart is a 69 y.o. female with PMH significant for Parkinson's disease, hypertension, GERD who presents to the emergency department with reports of episode of confusion following taking 2 laxatives this morning after feeling constipated.  Patient reports she subsequently had diarrhea.  Grandson reports she then became confused and was going to see her mother who is .  Daughter reports patient is now less confused and answering questions appropriately.  Denies trauma.  Denies any recent fever, chills, nausea, vomiting, diarrhea, constipation.  Patient has no complaints at this time.  Nursing Notes were reviewed.    REVIEW OF SYSTEMS       Review of Systems   Constitutional:  Negative for chills and fever.   Respiratory:  Negative for cough, shortness of breath and wheezing.    Cardiovascular:  Negative for chest pain.   Gastrointestinal:  Positive for diarrhea. Negative for abdominal pain, nausea and vomiting.   Genitourinary:  Negative for difficulty urinating and flank pain.   Skin:  Negative for wound.   Neurological:  Negative for headaches.   Psychiatric/Behavioral:  Positive for confusion. Negative for suicidal ideas.            PAST MEDICAL HISTORY     Past Medical History:   Diagnosis Date    Arthritis     Asthma     Cancer (HCC)     MELANOMA    Chronic pain     BACK    Constipation     Factor 5 Leiden mutation, heterozygous (HCC)     Gallbladder polyp     GERD (gastroesophageal reflux disease)     Hypertension     Liver disease     ELEVATED LIVER ENZYMES     Melanoma (HCC)     Osteoporosis     Parkinson's disease (HCC)     Seizures (HCC)     FEBRILE SEIZURES AS A

## 2025-03-07 ENCOUNTER — TRANSCRIBE ORDERS (OUTPATIENT)
Facility: HOSPITAL | Age: 70
End: 2025-03-07

## 2025-03-07 ENCOUNTER — HOSPITAL ENCOUNTER (OUTPATIENT)
Facility: HOSPITAL | Age: 70
Discharge: HOME | End: 2025-03-10
Payer: MEDICARE

## 2025-03-07 DIAGNOSIS — M25.511 RIGHT SHOULDER PAIN, UNSPECIFIED CHRONICITY: ICD-10-CM

## 2025-03-07 DIAGNOSIS — M25.551 RIGHT HIP PAIN: Primary | ICD-10-CM

## 2025-03-07 DIAGNOSIS — M25.551 RIGHT HIP PAIN: ICD-10-CM

## 2025-03-07 PROCEDURE — 73030 X-RAY EXAM OF SHOULDER: CPT

## 2025-03-07 PROCEDURE — 73522 X-RAY EXAM HIPS BI 3-4 VIEWS: CPT

## 2025-03-07 PROCEDURE — 73060 X-RAY EXAM OF HUMERUS: CPT

## 2025-03-14 ENCOUNTER — TRANSCRIBE ORDERS (OUTPATIENT)
Facility: HOSPITAL | Age: 70
End: 2025-03-14

## 2025-03-14 DIAGNOSIS — Z12.31 ENCOUNTER FOR SCREENING MAMMOGRAM FOR MALIGNANT NEOPLASM OF BREAST: Primary | ICD-10-CM

## 2025-03-24 ENCOUNTER — TRANSCRIBE ORDERS (OUTPATIENT)
Facility: HOSPITAL | Age: 70
End: 2025-03-24

## 2025-03-24 DIAGNOSIS — R40.4 TRANSIENT ALTERATION OF AWARENESS: Primary | ICD-10-CM

## 2025-04-01 ENCOUNTER — HOSPITAL ENCOUNTER (OUTPATIENT)
Facility: HOSPITAL | Age: 70
Discharge: HOME | End: 2025-04-04
Payer: MEDICARE

## 2025-04-01 DIAGNOSIS — R40.4 TRANSIENT ALTERATION OF AWARENESS: ICD-10-CM

## 2025-04-01 PROCEDURE — 70551 MRI BRAIN STEM W/O DYE: CPT

## 2025-05-01 ENCOUNTER — HOSPITAL ENCOUNTER (OUTPATIENT)
Facility: HOSPITAL | Age: 70
Discharge: HOME | End: 2025-05-01
Payer: MEDICARE

## 2025-05-01 VITALS — WEIGHT: 160 LBS | BODY MASS INDEX: 31.25 KG/M2

## 2025-05-01 DIAGNOSIS — Z12.31 ENCOUNTER FOR SCREENING MAMMOGRAM FOR MALIGNANT NEOPLASM OF BREAST: ICD-10-CM

## 2025-05-01 PROCEDURE — 77063 BREAST TOMOSYNTHESIS BI: CPT

## 2025-06-19 ENCOUNTER — HOSPITAL ENCOUNTER (OUTPATIENT)
Facility: HOSPITAL | Age: 70
Discharge: HOME | End: 2025-06-22
Payer: MEDICARE

## 2025-06-19 DIAGNOSIS — M48.061 SPINAL STENOSIS, LUMBAR REGION, WITHOUT NEUROGENIC CLAUDICATION: ICD-10-CM

## 2025-06-19 PROCEDURE — 72148 MRI LUMBAR SPINE W/O DYE: CPT

## (undated) DEVICE — SUTURE MCRYL SZ 2-0 L36IN ABSRB UD L36MM CT-1 1/2 CIR Y945H

## (undated) DEVICE — 6619 2 PTNT ISO SYS INCISE AREA&LT;(&GT;&&LT;)&GT;P: Brand: STERI-DRAPE™ IOBAN™ 2

## (undated) DEVICE — PADDING CAST SPEC 6INX4YD COT --

## (undated) DEVICE — SOLUTION IRRIG 3000ML 0.9% SOD CHL USP UROMATIC PLAS CONT

## (undated) DEVICE — SOLUTION SURG PREP 26 CC PURPREP

## (undated) DEVICE — SCRUB DRY SURG EZ SCRUB BRUSH PREOPERATIVE GRN

## (undated) DEVICE — 4-PORT MANIFOLD: Brand: NEPTUNE 2

## (undated) DEVICE — GLOVE ORTHO 8   MSG9480

## (undated) DEVICE — CONTAINER,SPECIMEN,3OZ,OR STRL: Brand: MEDLINE

## (undated) DEVICE — SUTURE VCRL SZ 2 L54IN ABSRB UD L65MM TP-1 1/2 CIR J880T

## (undated) DEVICE — SUTURE MCRYL SZ 4 0 L18IN ABSRB VLT PS 1 L24MM 3 8 CIR REV Y682H

## (undated) DEVICE — DRAPE,U/ SHT,SPLIT,PLAS,STERIL: Brand: MEDLINE

## (undated) DEVICE — SUTURE VCRL SZ 2-0 L27IN ABSRB UD L26MM SH 1/2 CIR J417H

## (undated) DEVICE — SOLUTION IV 50ML 0.9% SOD CHL

## (undated) DEVICE — C-ARM: Brand: UNBRANDED

## (undated) DEVICE — GLOVE SURG SZ 65 L12IN FNGR THK94MIL STD WHT LTX FREE

## (undated) DEVICE — ADHESIVE SKIN CLOSURE 4X22 CM PREMIERPRO EXOFINFUSION DISP

## (undated) DEVICE — Device: Brand: JELCO

## (undated) DEVICE — 450 ML BOTTLE OF 0.05% CHLORHEXIDINE GLUCONATE IN 99.95% STERILE WATER FOR IRRIGATION, USP AND APPLICATOR.: Brand: IRRISEPT ANTIMICROBIAL WOUND LAVAGE

## (undated) DEVICE — SUTURE ABSORBABLE BRAIDED 2-0 CT-1 27 IN UD VICRYL J259H

## (undated) DEVICE — TOTAL JOINT - SMH: Brand: MEDLINE INDUSTRIES, INC.

## (undated) DEVICE — SOLUTION IRRIG 1000ML STRL H2O USP PLAS POUR BTL

## (undated) DEVICE — GLOVE SURG SZ 65 L12IN FNGR THK79MIL GRN LTX FREE

## (undated) DEVICE — BLADE SAW W073XL276IN THK0031IN CUT THK0036IN REPL SAG

## (undated) DEVICE — SYR 20ML LL STRL LF --

## (undated) DEVICE — GLOVE SURG SZ 8 L12IN FNGR THK79MIL GRN LTX FREE

## (undated) DEVICE — T4 HOOD